# Patient Record
Sex: MALE | Race: WHITE | NOT HISPANIC OR LATINO
[De-identification: names, ages, dates, MRNs, and addresses within clinical notes are randomized per-mention and may not be internally consistent; named-entity substitution may affect disease eponyms.]

---

## 2018-06-08 ENCOUNTER — APPOINTMENT (OUTPATIENT)
Dept: OTOLARYNGOLOGY | Facility: CLINIC | Age: 23
End: 2018-06-08
Payer: COMMERCIAL

## 2018-06-08 VITALS
SYSTOLIC BLOOD PRESSURE: 128 MMHG | BODY MASS INDEX: 27.77 KG/M2 | HEIGHT: 78 IN | WEIGHT: 240 LBS | DIASTOLIC BLOOD PRESSURE: 86 MMHG | HEART RATE: 65 BPM

## 2018-06-08 DIAGNOSIS — Z78.9 OTHER SPECIFIED HEALTH STATUS: ICD-10-CM

## 2018-06-08 PROCEDURE — 99204 OFFICE O/P NEW MOD 45 MIN: CPT | Mod: 25

## 2018-06-08 PROCEDURE — 31231 NASAL ENDOSCOPY DX: CPT

## 2018-06-08 RX ORDER — FLUOXETINE HYDROCHLORIDE 20 MG/1
20 CAPSULE ORAL
Qty: 30 | Refills: 0 | Status: COMPLETED | COMMUNITY
Start: 2017-12-12

## 2018-06-08 RX ORDER — ESCITALOPRAM OXALATE 10 MG/1
10 TABLET ORAL
Qty: 30 | Refills: 0 | Status: COMPLETED | COMMUNITY
Start: 2018-02-01

## 2019-01-02 ENCOUNTER — APPOINTMENT (OUTPATIENT)
Dept: ORTHOPEDIC SURGERY | Facility: CLINIC | Age: 24
End: 2019-01-02
Payer: COMMERCIAL

## 2019-01-02 ENCOUNTER — FORM ENCOUNTER (OUTPATIENT)
Age: 24
End: 2019-01-02

## 2019-01-02 VITALS — RESPIRATION RATE: 16 BRPM | BODY MASS INDEX: 28.93 KG/M2 | HEIGHT: 78 IN | WEIGHT: 250 LBS

## 2019-01-02 PROCEDURE — 73070 X-RAY EXAM OF ELBOW: CPT | Mod: RT

## 2019-01-02 PROCEDURE — 99203 OFFICE O/P NEW LOW 30 MIN: CPT

## 2019-01-02 PROCEDURE — 73110 X-RAY EXAM OF WRIST: CPT | Mod: RT

## 2019-01-03 ENCOUNTER — APPOINTMENT (OUTPATIENT)
Dept: MRI IMAGING | Facility: CLINIC | Age: 24
End: 2019-01-03

## 2019-01-03 ENCOUNTER — APPOINTMENT (OUTPATIENT)
Dept: MRI IMAGING | Facility: CLINIC | Age: 24
End: 2019-01-03
Payer: COMMERCIAL

## 2019-01-03 ENCOUNTER — OUTPATIENT (OUTPATIENT)
Dept: OUTPATIENT SERVICES | Facility: HOSPITAL | Age: 24
LOS: 1 days | End: 2019-01-03
Payer: COMMERCIAL

## 2019-01-03 DIAGNOSIS — M25.532 PAIN IN LEFT WRIST: ICD-10-CM

## 2019-01-03 PROCEDURE — 73221 MRI JOINT UPR EXTREM W/O DYE: CPT

## 2019-01-03 PROCEDURE — 73221 MRI JOINT UPR EXTREM W/O DYE: CPT | Mod: 26,RT

## 2019-01-11 ENCOUNTER — APPOINTMENT (OUTPATIENT)
Dept: ORTHOPEDIC SURGERY | Facility: CLINIC | Age: 24
End: 2019-01-11
Payer: COMMERCIAL

## 2019-01-11 VITALS — RESPIRATION RATE: 16 BRPM | BODY MASS INDEX: 28.93 KG/M2 | HEIGHT: 78 IN | WEIGHT: 250 LBS

## 2019-01-11 DIAGNOSIS — M25.532 PAIN IN LEFT WRIST: ICD-10-CM

## 2019-01-11 PROCEDURE — 73110 X-RAY EXAM OF WRIST: CPT | Mod: RT

## 2019-01-11 PROCEDURE — 73070 X-RAY EXAM OF ELBOW: CPT | Mod: RT

## 2019-01-11 PROCEDURE — 99214 OFFICE O/P EST MOD 30 MIN: CPT

## 2019-01-11 NOTE — PHYSICAL EXAM
[de-identified] : Physical exam demonstrates the patient to be alert and oriented x 3 and capable of ambulation. The patient is well-developed and well-nourished in no apparent respiratory distress. The majority of the skin is intact bilaterally in the upper extremities without any bilateral elbow lymphadenopathy. \par \par \par There is good capillary refill of the digits bilaterally. There are no masses palpated or sensitivity over the median and ulnar nerves at the level of the wrist. There is a negative Tinel's and negative Phalen's and a negative carpal tunnel compression test bilaterally. Sensation is intact to light touch bilaterally.\par Range of motionOn right elbow from near full extension to 120° of flexion with supination of 70° and pronation is 60°.  over bilateral scaphoid tubercle fractures [de-identified] : PA lateral oblique x-rays of both wrists demonstrate nondisplaced scaphoid tubercle fractures as well as a radial head fracture in good alignment on the right

## 2019-01-11 NOTE — HISTORY OF PRESENT ILLNESS
[Right] : right hand dominant [FreeTextEntry1] : DOI: 12/29/18\par \par Pt returns to discuss treatment plan s/p MRI results showing bilateral scaphoid tubercle fractures. Patient also had bilateral scaphoid tubercle fractures

## 2019-01-11 NOTE — ASSESSMENT
[FreeTextEntry1] : We discussed treatment options. She wishes making was undertaken. She may recommend protected range of motion right elbow. We discussed casting one wrist versus splinting both and the risks associated with it. Certainly with the removal placed slightly higher chance of a nonunion but because it is bilateral this is difficult for him. He would like to go with full-time splinting. He will continue full-time splinting I will see him back in 3-4 weeks

## 2019-02-08 ENCOUNTER — APPOINTMENT (OUTPATIENT)
Dept: ORTHOPEDIC SURGERY | Facility: CLINIC | Age: 24
End: 2019-02-08
Payer: COMMERCIAL

## 2019-02-08 VITALS — WEIGHT: 250 LBS | HEIGHT: 78 IN | RESPIRATION RATE: 16 BRPM | BODY MASS INDEX: 28.93 KG/M2

## 2019-02-08 DIAGNOSIS — S62.024D NONDISPLACED FRACTURE OF MIDDLE THIRD OF NAVICULAR [SCAPHOID] BONE OF RIGHT WRIST, SUBSEQUENT ENCOUNTER FOR FRACTURE WITH ROUTINE HEALING: ICD-10-CM

## 2019-02-08 DIAGNOSIS — S62.025A NONDISPLACED FRACTURE OF MIDDLE THIRD OF NAVICULAR [SCAPHOID] BONE OF LEFT WRIST, INITIAL ENCOUNTER FOR CLOSED FRACTURE: ICD-10-CM

## 2019-02-08 DIAGNOSIS — S52.134D NONDISPLACED FRACTURE OF NECK OF RIGHT RADIUS, SUBSEQUENT ENCOUNTER FOR CLOSED FRACTURE WITH ROUTINE HEALING: ICD-10-CM

## 2019-02-08 PROCEDURE — 73110 X-RAY EXAM OF WRIST: CPT | Mod: LT

## 2019-02-08 PROCEDURE — 99214 OFFICE O/P EST MOD 30 MIN: CPT

## 2019-02-08 PROCEDURE — 73070 X-RAY EXAM OF ELBOW: CPT | Mod: RT

## 2020-11-23 ENCOUNTER — APPOINTMENT (OUTPATIENT)
Dept: OTOLARYNGOLOGY | Facility: CLINIC | Age: 25
End: 2020-11-23
Payer: COMMERCIAL

## 2020-11-23 VITALS
TEMPERATURE: 97.7 F | DIASTOLIC BLOOD PRESSURE: 66 MMHG | HEART RATE: 59 BPM | BODY MASS INDEX: 27.77 KG/M2 | SYSTOLIC BLOOD PRESSURE: 138 MMHG | HEIGHT: 78 IN | WEIGHT: 240 LBS

## 2020-11-23 PROCEDURE — G0268 REMOVAL OF IMPACTED WAX MD: CPT

## 2020-11-23 PROCEDURE — 99214 OFFICE O/P EST MOD 30 MIN: CPT | Mod: 25

## 2020-11-23 PROCEDURE — 92567 TYMPANOMETRY: CPT

## 2020-11-23 PROCEDURE — 92557 COMPREHENSIVE HEARING TEST: CPT

## 2020-11-23 NOTE — ASSESSMENT
[FreeTextEntry1] : pt with non- obstructing cerumen and hearing loss\par also with injury 2 mo ago not completely healed, will give drops and re-assess to make sure heals or can consider biopsy \par ear hygiene\par Audio WNL\par follow up in a few weeks\par

## 2020-11-23 NOTE — PHYSICAL EXAM
[Normal] : tympanic membranes are normal in both ears [de-identified] : some cerumen right, some cerumen left, also a scab on the floor of the canal clear, not completely healed under it - junction 1/3 to out floor of the canal

## 2020-11-23 NOTE — PROCEDURE
[FreeTextEntry3] : Procedure- removal of cerumen bilaterally\par Diagnosis - cerumen impaction\par bilateral ears found to have impacted cerumen - they were cleared with suction and curette, canals appeared normal.\par scab cleared left floor of the canal

## 2020-11-23 NOTE — HISTORY OF PRESENT ILLNESS
[de-identified] : right 2-3 weeks \par left 1-2 mo \par ear pressure and some hearing loss\par blood from left ear after manipulation 2 mo ago\par no Vertigo, tinnitus, pain, drainage or facial weakness.\par \par feels nose is ok \par

## 2020-12-22 ENCOUNTER — APPOINTMENT (OUTPATIENT)
Dept: OTOLARYNGOLOGY | Facility: CLINIC | Age: 25
End: 2020-12-22

## 2021-01-07 ENCOUNTER — EMERGENCY (EMERGENCY)
Facility: HOSPITAL | Age: 26
LOS: 1 days | Discharge: HOME | End: 2021-01-07
Attending: EMERGENCY MEDICINE | Admitting: EMERGENCY MEDICINE
Payer: COMMERCIAL

## 2021-01-07 VITALS
TEMPERATURE: 97 F | HEIGHT: 78 IN | DIASTOLIC BLOOD PRESSURE: 95 MMHG | RESPIRATION RATE: 16 BRPM | SYSTOLIC BLOOD PRESSURE: 153 MMHG | OXYGEN SATURATION: 98 % | WEIGHT: 255.07 LBS | HEART RATE: 88 BPM

## 2021-01-07 VITALS
SYSTOLIC BLOOD PRESSURE: 129 MMHG | DIASTOLIC BLOOD PRESSURE: 88 MMHG | RESPIRATION RATE: 20 BRPM | TEMPERATURE: 98 F | HEART RATE: 76 BPM | OXYGEN SATURATION: 98 %

## 2021-01-07 LAB
ALBUMIN SERPL ELPH-MCNC: 3.9 G/DL — SIGNIFICANT CHANGE UP (ref 3.3–5)
ALP SERPL-CCNC: 89 U/L — SIGNIFICANT CHANGE UP (ref 30–120)
ALT FLD-CCNC: 35 U/L DA — SIGNIFICANT CHANGE UP (ref 10–60)
ANION GAP SERPL CALC-SCNC: 9 MMOL/L — SIGNIFICANT CHANGE UP (ref 5–17)
AST SERPL-CCNC: 23 U/L — SIGNIFICANT CHANGE UP (ref 10–40)
BASOPHILS # BLD AUTO: 0.05 K/UL — SIGNIFICANT CHANGE UP (ref 0–0.2)
BASOPHILS NFR BLD AUTO: 0.9 % — SIGNIFICANT CHANGE UP (ref 0–2)
BILIRUB SERPL-MCNC: 0.4 MG/DL — SIGNIFICANT CHANGE UP (ref 0.2–1.2)
BUN SERPL-MCNC: 15 MG/DL — SIGNIFICANT CHANGE UP (ref 7–23)
CALCIUM SERPL-MCNC: 8.9 MG/DL — SIGNIFICANT CHANGE UP (ref 8.4–10.5)
CHLORIDE SERPL-SCNC: 104 MMOL/L — SIGNIFICANT CHANGE UP (ref 96–108)
CO2 SERPL-SCNC: 26 MMOL/L — SIGNIFICANT CHANGE UP (ref 22–31)
CREAT SERPL-MCNC: 0.98 MG/DL — SIGNIFICANT CHANGE UP (ref 0.5–1.3)
D DIMER BLD IA.RAPID-MCNC: <150 NG/ML DDU — SIGNIFICANT CHANGE UP
EOSINOPHIL # BLD AUTO: 0.45 K/UL — SIGNIFICANT CHANGE UP (ref 0–0.5)
EOSINOPHIL NFR BLD AUTO: 8.4 % — HIGH (ref 0–6)
GLUCOSE SERPL-MCNC: 103 MG/DL — HIGH (ref 70–99)
HCT VFR BLD CALC: 45.1 % — SIGNIFICANT CHANGE UP (ref 39–50)
HGB BLD-MCNC: 15.5 G/DL — SIGNIFICANT CHANGE UP (ref 13–17)
IMM GRANULOCYTES NFR BLD AUTO: 0.2 % — SIGNIFICANT CHANGE UP (ref 0–1.5)
LYMPHOCYTES # BLD AUTO: 1.17 K/UL — SIGNIFICANT CHANGE UP (ref 1–3.3)
LYMPHOCYTES # BLD AUTO: 21.8 % — SIGNIFICANT CHANGE UP (ref 13–44)
MCHC RBC-ENTMCNC: 29.9 PG — SIGNIFICANT CHANGE UP (ref 27–34)
MCHC RBC-ENTMCNC: 34.4 GM/DL — SIGNIFICANT CHANGE UP (ref 32–36)
MCV RBC AUTO: 87.1 FL — SIGNIFICANT CHANGE UP (ref 80–100)
MONOCYTES # BLD AUTO: 0.5 K/UL — SIGNIFICANT CHANGE UP (ref 0–0.9)
MONOCYTES NFR BLD AUTO: 9.3 % — SIGNIFICANT CHANGE UP (ref 2–14)
NEUTROPHILS # BLD AUTO: 3.19 K/UL — SIGNIFICANT CHANGE UP (ref 1.8–7.4)
NEUTROPHILS NFR BLD AUTO: 59.4 % — SIGNIFICANT CHANGE UP (ref 43–77)
NRBC # BLD: 0 /100 WBCS — SIGNIFICANT CHANGE UP (ref 0–0)
PLATELET # BLD AUTO: 223 K/UL — SIGNIFICANT CHANGE UP (ref 150–400)
POTASSIUM SERPL-MCNC: 3.7 MMOL/L — SIGNIFICANT CHANGE UP (ref 3.5–5.3)
POTASSIUM SERPL-SCNC: 3.7 MMOL/L — SIGNIFICANT CHANGE UP (ref 3.5–5.3)
PROT SERPL-MCNC: 7.6 G/DL — SIGNIFICANT CHANGE UP (ref 6–8.3)
RBC # BLD: 5.18 M/UL — SIGNIFICANT CHANGE UP (ref 4.2–5.8)
RBC # FLD: 11.2 % — SIGNIFICANT CHANGE UP (ref 10.3–14.5)
SARS-COV-2 RNA SPEC QL NAA+PROBE: DETECTED
SODIUM SERPL-SCNC: 139 MMOL/L — SIGNIFICANT CHANGE UP (ref 135–145)
TROPONIN I SERPL-MCNC: 0 NG/ML — LOW (ref 0.02–0.06)
WBC # BLD: 5.37 K/UL — SIGNIFICANT CHANGE UP (ref 3.8–10.5)
WBC # FLD AUTO: 5.37 K/UL — SIGNIFICANT CHANGE UP (ref 3.8–10.5)

## 2021-01-07 PROCEDURE — 85025 COMPLETE CBC W/AUTO DIFF WBC: CPT

## 2021-01-07 PROCEDURE — 36415 COLL VENOUS BLD VENIPUNCTURE: CPT

## 2021-01-07 PROCEDURE — 80053 COMPREHEN METABOLIC PANEL: CPT

## 2021-01-07 PROCEDURE — 99285 EMERGENCY DEPT VISIT HI MDM: CPT

## 2021-01-07 PROCEDURE — U0005: CPT

## 2021-01-07 PROCEDURE — 84484 ASSAY OF TROPONIN QUANT: CPT

## 2021-01-07 PROCEDURE — 85379 FIBRIN DEGRADATION QUANT: CPT

## 2021-01-07 PROCEDURE — 71045 X-RAY EXAM CHEST 1 VIEW: CPT | Mod: 26

## 2021-01-07 PROCEDURE — U0003: CPT

## 2021-01-07 PROCEDURE — 71045 X-RAY EXAM CHEST 1 VIEW: CPT

## 2021-01-07 PROCEDURE — 93005 ELECTROCARDIOGRAM TRACING: CPT

## 2021-01-07 PROCEDURE — 84443 ASSAY THYROID STIM HORMONE: CPT

## 2021-01-07 PROCEDURE — 99284 EMERGENCY DEPT VISIT MOD MDM: CPT | Mod: 25

## 2021-01-07 PROCEDURE — 93010 ELECTROCARDIOGRAM REPORT: CPT

## 2021-01-07 NOTE — ED PROVIDER NOTE - PATIENT PORTAL LINK FT
You can access the FollowMyHealth Patient Portal offered by [facility] by registering at the following website: [email or web address]. By joining [facility]’s FollowMyHealth portal, you will also be able to view your health information using other applications (apps) compatible with our system.

## 2021-01-07 NOTE — ED PROVIDER NOTE - PROGRESS NOTE DETAILS
EKG labs and CXR normal. COVID swab pending. NSR on monitor thruout ER stay. Plan - DC home with cardio follow up.

## 2021-01-07 NOTE — ED ADULT NURSE NOTE - NSIMPLEMENTINTERV_GEN_ALL_ED
Implemented All Universal Safety Interventions:  [place] to call system. Call bell, personal items and telephone within reach. Instruct patient to call for assistance. Room bathroom lighting operational. Non-slip footwear when patient is off stretcher. Physically safe environment: no spills, clutter or unnecessary equipment. Stretcher in lowest position, wheels locked, appropriate side rails in place.

## 2021-01-07 NOTE — ED PROVIDER NOTE - OBJECTIVE STATEMENT
27 y/o M with no significant PMHx presents to the ED c/o palpitations, rapid heart beat around 12:30 PM today while working on computer. Pt drank some water and walked around for a while and sx resolved. Pt now feels fine. Admits to taking 120 mg pseudophed for nasal congestion. Denies fever, cough, SOB , headache, chest pain, or other sx. Has no PCP present. Non-smoker. No recent EtOH intake. States he has ritalin prescription at home but hasn't been taking it for a while.

## 2021-01-07 NOTE — ED ADULT NURSE NOTE - CAS DISCH CONDITION
Mya reyes patient representative family is really concerned regarding the recent increase of Seroquel from 100 mg to 150 mg at bedtime and the start of lexapro 10 mg requesting to keep the Seroquel at 100 mg nightly and see how she does with the addition of lexapro 10 mg daily.      Printed for MD   New orders will need to be faxed to [facility] [phone] and [facility] [phone]   Stable

## 2021-01-07 NOTE — ED PROVIDER NOTE - INTERPRETATION
normal sinus rhythm normal sinus rhythm, Normal axis, Normal OH interval and QRS complex. There are no acute ischemic ST or T-wave changes.

## 2021-01-07 NOTE — ED PROVIDER NOTE - CLINICAL SUMMARY MEDICAL DECISION MAKING FREE TEXT BOX
26 M with palpitations after taking large dose of decongestant. Plan: EKG, labs, and monitor on cardiac monitor.

## 2021-01-07 NOTE — ED PROVIDER NOTE - CARE PROVIDER_API CALL
uQang Irwin  CARDIOVASCULAR DISEASE  [address], [address]  [place] [address]  Phone: [phone]  Fax: [phone]  Follow Up Time: 1-3 Days

## 2021-01-07 NOTE — ED ADULT TRIAGE NOTE - CHIEF COMPLAINT QUOTE
" I was at home working , I felt out of breath and then I felt my heart rate was fast and   about to faint "

## 2021-01-07 NOTE — ED ADULT NURSE NOTE - OBJECTIVE STATEMENT
26 YOM A&OX3 with no pertinent pmh presents to ED for chest palpitations. Pt states he was working on his computer earlier today when he felt his heart beating fast around 12:30pm. pt states his symptoms resolved after walking around and drinking water. pt states he did take Sudafed for nasal congestion and drank one cup of coffee before incident. pt expresses chest discomfort rated 1/10. pt placed on cardiac monitor in ED shows NSR in 60s, EKG completed in ED. pt denies chest pain, sob, dizziness, blurry vision, n/v/d, headaches. safety maintained.

## 2021-01-07 NOTE — ED ADULT NURSE NOTE - JUGULAR VENOUS DISTENTION
REVIEW OF SYSTEMS:    CONSTITUTIONAL: No weakness, fevers or chills  EYES/ENT: No visual changes;  No vertigo or throat pain   NECK: No pain or stiffness  RESPIRATORY: No cough, wheezing, hemoptysis; No shortness of breath  CARDIOVASCULAR: No chest pain or palpitations  GASTROINTESTINAL: No abdominal or epigastric pain. + nausea, vomiting, or hematemesis; No diarrhea or constipation. No melena or hematochezia.  GENITOURINARY: No dysuria, frequency or hematuria L flank pain  NEUROLOGICAL: No numbness or weakness  SKIN: No itching, burning, rashes, or lesions   All other review of systems is negative unless indicated above.
absent

## 2021-01-07 NOTE — ED PROVIDER NOTE - NSFOLLOWUPINSTRUCTIONS_ED_ALL_ED_FT
Heart Palpitations    WHAT YOU NEED TO KNOW:    Heart palpitations are feelings that your heart races, jumps, throbs, or flutters. You may feel extra beats, no beats for a short time, or skipped beats. You may have these feelings in your chest, throat, or neck. They may happen when you are sitting, standing, or lying. Heart palpitations may be frightening, but are usually not caused by a serious problem.     DISCHARGE INSTRUCTIONS:    Call 911 or have someone else call for any of the following:   •You have any of the following signs of a heart attack: ?Squeezing, pressure, or pain in your chest      ?You may also have any of the following: ?Discomfort or pain in your back, neck, jaw, stomach, or arm      ?Shortness of breath      ?Nausea or vomiting      ?Lightheadedness or a sudden cold sweat        •You have any of the following signs of a stroke: ?Numbness or drooping on one side of your face       ?Weakness in an arm or leg      ?Confusion or difficulty speaking      ?Dizziness, a severe headache, or vision loss      •You faint or lose consciousness.       Return to the emergency department if:   •Your palpitations happen more often or get more intense.           Contact your healthcare provider if:   •You have new or worsening swelling in your feet or ankles.      •You have questions or concerns about your condition or care.      Follow up with your healthcare provider as directed: You may need to follow up with a cardiologist. You may need tests to check for heart problems that cause palpitations. Write down your questions so you remember to ask them during your visits.     Keep a record: Write down when your palpitations start and stop, what you were doing when they started, and your symptoms. Keep track of what you ate or drank within a few hours of your palpitations. Include anything that seemed to help your symptoms, such as lying down or holding your breath. This record will help you and your healthcare provider learn what triggers your palpitations. Bring this record with you to your follow up visits.    Help prevent heart palpitations:   •Manage stress and anxiety. Find ways to relax such as listening to music, meditating, or doing yoga. Exercise can also help decrease stress and anxiety. Talk to someone you trust about your stress or anxiety. You can also talk to a therapist.       •Get plenty of sleep every night. Ask your healthcare provider how much sleep you need each night.       •Do not drink caffeine or alcohol. Caffeine and alcohol can make your palpitations worse. Caffeine is found in soda, coffee, tea, chocolate, and drinks that increase your energy.       •Do not smoke. Nicotine and other chemicals in cigarettes and cigars may damage your heart and blood vessels. Ask your healthcare provider for information if you currently smoke and need help to quit. E-cigarettes or smokeless tobacco still contain nicotine. Talk to your healthcare provider before you use these products.       •Do not use illegal drugs. Talk to your healthcare provider if you use illegal drugs and want help to quit.

## 2021-01-08 LAB — TSH SERPL-MCNC: 0.86 UIU/ML — SIGNIFICANT CHANGE UP (ref 0.27–4.2)

## 2021-01-15 PROBLEM — Z78.9 OTHER SPECIFIED HEALTH STATUS: Chronic | Status: ACTIVE | Noted: 2021-01-07

## 2021-02-05 ENCOUNTER — NON-APPOINTMENT (OUTPATIENT)
Age: 26
End: 2021-02-05

## 2021-02-05 ENCOUNTER — APPOINTMENT (OUTPATIENT)
Dept: CARDIOLOGY | Facility: CLINIC | Age: 26
End: 2021-02-05
Payer: COMMERCIAL

## 2021-02-05 VITALS
WEIGHT: 245 LBS | OXYGEN SATURATION: 96 % | BODY MASS INDEX: 28.35 KG/M2 | DIASTOLIC BLOOD PRESSURE: 85 MMHG | HEIGHT: 78 IN | HEART RATE: 64 BPM | SYSTOLIC BLOOD PRESSURE: 130 MMHG

## 2021-02-05 PROCEDURE — 99204 OFFICE O/P NEW MOD 45 MIN: CPT

## 2021-02-05 PROCEDURE — 93000 ELECTROCARDIOGRAM COMPLETE: CPT

## 2021-02-05 PROCEDURE — 99072 ADDL SUPL MATRL&STAF TM PHE: CPT

## 2021-02-05 RX ORDER — TRAZODONE HYDROCHLORIDE 50 MG/1
50 TABLET ORAL
Qty: 60 | Refills: 0 | Status: DISCONTINUED | COMMUNITY
Start: 2018-04-13 | End: 2021-02-05

## 2021-02-05 RX ORDER — METHYLPHENIDATE HYDROCHLORIDE 20 MG/1
20 TABLET ORAL
Qty: 90 | Refills: 0 | Status: DISCONTINUED | COMMUNITY
Start: 2018-04-27 | End: 2021-02-05

## 2021-02-05 RX ORDER — ONDANSETRON 4 MG/1
4 TABLET, ORALLY DISINTEGRATING ORAL
Qty: 30 | Refills: 0 | Status: DISCONTINUED | COMMUNITY
Start: 2018-02-01 | End: 2021-02-05

## 2021-02-05 RX ORDER — ESCITALOPRAM OXALATE 20 MG/1
20 TABLET ORAL
Qty: 30 | Refills: 0 | Status: DISCONTINUED | COMMUNITY
Start: 2018-04-13 | End: 2021-02-05

## 2021-02-05 RX ORDER — OFLOXACIN OTIC 3 MG/ML
0.3 SOLUTION AURICULAR (OTIC) TWICE DAILY
Qty: 1 | Refills: 2 | Status: DISCONTINUED | COMMUNITY
Start: 2020-11-23 | End: 2021-02-05

## 2021-02-08 ENCOUNTER — APPOINTMENT (OUTPATIENT)
Dept: OTOLARYNGOLOGY | Facility: CLINIC | Age: 26
End: 2021-02-08
Payer: COMMERCIAL

## 2021-02-08 VITALS
DIASTOLIC BLOOD PRESSURE: 85 MMHG | WEIGHT: 245 LBS | SYSTOLIC BLOOD PRESSURE: 134 MMHG | HEART RATE: 55 BPM | BODY MASS INDEX: 28.35 KG/M2 | TEMPERATURE: 98 F | HEIGHT: 78 IN

## 2021-02-08 DIAGNOSIS — H61.21 IMPACTED CERUMEN, RIGHT EAR: ICD-10-CM

## 2021-02-08 DIAGNOSIS — S00.412D: ICD-10-CM

## 2021-02-08 PROCEDURE — G0268 REMOVAL OF IMPACTED WAX MD: CPT

## 2021-02-08 PROCEDURE — 99072 ADDL SUPL MATRL&STAF TM PHE: CPT

## 2021-02-08 PROCEDURE — 31231 NASAL ENDOSCOPY DX: CPT

## 2021-02-08 PROCEDURE — 92557 COMPREHENSIVE HEARING TEST: CPT

## 2021-02-08 PROCEDURE — 99214 OFFICE O/P EST MOD 30 MIN: CPT | Mod: 25

## 2021-02-08 PROCEDURE — 92567 TYMPANOMETRY: CPT

## 2021-02-08 NOTE — HISTORY OF PRESENT ILLNESS
[de-identified] : pt following up after a injury that happened in 2020, had a left sided abrasion, is s/p oflaxin use. \par S/p covid in Jan since then has been having R ear pressure and tinnitus. with R HL \par some days can be worse than others  \par +occ popping R ear \par no Vertigo, pain, drainage or facial weakness.\par \par Pt also with intermittent occ nasal congestion b/l that has been going on for many years but does not happen everyday\par R sided sinus pressure around his eyes and frontal region

## 2021-02-08 NOTE — PROCEDURE
[FreeTextEntry3] : Procedure- removal of cerumen/ FB right \par Diagnosis - right cerumen/FB impaction\par Right ear found to have impacted cerumen/FB - it was cleared with curette, canal appeared normal.\par  [FreeTextEntry6] : Procedure performed: Nasal Endoscopy- Diagnostic\par Pre-op indication(s): nasal congestion\par Post-op indication(s): nasal congestion \par Verbal and/or written consent obtained from patient\par Anterior rhinoscopy insufficient to account for symptoms\par Scope #: 3,  flexible fiber optic telescope \par The scope was introduced in the nasal passage between the middle and inferior turbinates to exam the inferior portion of the middle meatus and the fontanelle, as well as the maxillary ostia.  Next, the scope was passed medically and posteriorly to the middle turbinates to examine the sphenoethmoid recess and the superior turbinate region.\par Upon visualization the finders are as follows:\par Nasal Septum: sigmoidal septal deviation\par Bilateral - Mucosa: boggy turbinates, Mucous: scant, Polyp: OMC polypoid changes b/l  , Inferior Turbinate: boggy, Middle Turbinate: normal, Superior Turbinate: normal, Inferior Meatus: narrow, Middle Meatus: narrow, Super Meatus:normal, Sphenoethmoidal Recess: clear\par

## 2021-02-08 NOTE — ASSESSMENT
[FreeTextEntry1] : pt with non- obstructing cerumen and subjective hearing loss\par injury in nov 2020, abrasion of the left ear canal, s/p ofloxacin drops- healing well \par On exam today AD- dry, and with cerumen/cotton; AS: abrasion healed well \par ear hygiene\par Audio WNL\par possibly transient ET disfunction, will treat nose and see if improves \par \par \par \par pt also with mild polypoid changes b/l \par Nasal congestion with mild septal deviation and bilateral turbinate hypertrophy. Will start regiment to see if may improve symptoms and escalate if needed \par - Will start Flonase. A topical steroid reduce mucosal swelling, illustrated appropriate use and how to reduce the risk of bleeding \par - Nasal irrigation and showed how to use it to maximize effectiveness \par \par

## 2021-02-08 NOTE — END OF VISIT
[FreeTextEntry3] : I personally saw and examined ANTONETTE FLORES in detail. I spoke to ALLEY Marquez regarding the assessment and plan of care.  I preformed the procedures and I reviewed the above assessment and plan of care, and agree. I have made changes in changes in the body of the note where appropriate.\par \par

## 2021-02-08 NOTE — PHYSICAL EXAM
[Midline] : trachea located in midline position [Normal] : no rashes [de-identified] : AD- dry, and with cerumen/cotton; AS: abrasion healing well

## 2021-02-21 NOTE — PHYSICAL EXAM
[General Appearance - Well Developed] : well developed [Normal Appearance] : normal appearance [Well Groomed] : well groomed [General Appearance - Well Nourished] : well nourished [No Deformities] : no deformities [General Appearance - In No Acute Distress] : no acute distress [Normal Conjunctiva] : the conjunctiva exhibited no abnormalities [Eyelids - No Xanthelasma] : the eyelids demonstrated no xanthelasmas [Normal Oral Mucosa] : normal oral mucosa [No Oral Pallor] : no oral pallor [No Oral Cyanosis] : no oral cyanosis [Normal Jugular Venous A Waves Present] : normal jugular venous A waves present [Normal Jugular Venous V Waves Present] : normal jugular venous V waves present [No Jugular Venous Brown A Waves] : no jugular venous brown A waves [Heart Rate And Rhythm] : heart rate and rhythm were normal [Heart Sounds] : normal S1 and S2 [Murmurs] : no murmurs present [Respiration, Rhythm And Depth] : normal respiratory rhythm and effort [Exaggerated Use Of Accessory Muscles For Inspiration] : no accessory muscle use [Auscultation Breath Sounds / Voice Sounds] : lungs were clear to auscultation bilaterally [Abdomen Soft] : soft [Abdomen Tenderness] : non-tender [Abdomen Mass (___ Cm)] : no abdominal mass palpated [Abnormal Walk] : normal gait [Gait - Sufficient For Exercise Testing] : the gait was sufficient for exercise testing [Nail Clubbing] : no clubbing of the fingernails [Cyanosis, Localized] : no localized cyanosis [Petechial Hemorrhages (___cm)] : no petechial hemorrhages [Skin Color & Pigmentation] : normal skin color and pigmentation [] : no rash [No Venous Stasis] : no venous stasis [Skin Lesions] : no skin lesions [No Skin Ulcers] : no skin ulcer [No Xanthoma] : no  xanthoma was observed [Oriented To Time, Place, And Person] : oriented to person, place, and time [Affect] : the affect was normal [Mood] : the mood was normal [No Anxiety] : not feeling anxious

## 2021-02-24 ENCOUNTER — APPOINTMENT (OUTPATIENT)
Dept: OTOLARYNGOLOGY | Facility: CLINIC | Age: 26
End: 2021-02-24
Payer: COMMERCIAL

## 2021-02-24 VITALS
BODY MASS INDEX: 28.35 KG/M2 | SYSTOLIC BLOOD PRESSURE: 124 MMHG | TEMPERATURE: 97.4 F | HEIGHT: 78 IN | HEART RATE: 75 BPM | DIASTOLIC BLOOD PRESSURE: 85 MMHG | WEIGHT: 245 LBS

## 2021-02-24 PROCEDURE — 99072 ADDL SUPL MATRL&STAF TM PHE: CPT

## 2021-02-24 PROCEDURE — 99214 OFFICE O/P EST MOD 30 MIN: CPT | Mod: 25

## 2021-02-24 PROCEDURE — 31231 NASAL ENDOSCOPY DX: CPT

## 2021-02-24 NOTE — HISTORY OF PRESENT ILLNESS
[No] : patient does not have a  history of radiation therapy [de-identified] : 26-year-old male\par Patient complains of right sided tinnitus x 2 months. States his right ear feels congested, has tried using Flonase with no relief of ear congestion. Trying to pop his ear with no relief.  Tinnitus is a constant dull buzzing, worse at night. States this all started after he got COVID. \par Pt has no ear pain, ear drainage, hearing loss, vertigo, nasal discharge, epistaxis, sinus infections, facial pain, facial pressure, throat pain, dysphagia or fevers\par \par  [Tinnitus] : tinnitus [Eustachian Tube Dysfunction] : no eustachian tube dysfunction [Cholesteatoma] : no cholesteatoma [Early Onset Hearing Loss] : no early onset hearing loss [Stroke] : no stroke [Nasal Congestion] : nasal congestion [Allergic Rhinitis] : no allergic rhinitis [Environmental Allergens] : no environmental allergens [Allergies] : no allergies [Asthma] : no asthma [Neck Mass] : no neck mass [Neck Pain] : no neck pain [Chills] : no chills [Hyperthyroidism] : no hyperthyroidism [Sialadenitis] : no sialadenitis [Hodgkin Disease] : no hodgkin disease [Non-Hodgkin Lymphoma] : no non-hodgkin lymphoma [None] : No risk factors have been identified. [Graves Disease] : no graves disease [Thyroid Cancer] : no thyroid cancer

## 2021-02-24 NOTE — PHYSICAL EXAM
[Nasal Endoscopy Performed] : nasal endoscopy was performed, see procedure section for findings [] : septum deviated to the right [Midline] : trachea located in midline position [Normal] : no rashes [Hearing Loss Right Only] : normal [Hearing Loss Left Only] : normal

## 2021-02-24 NOTE — ASSESSMENT
[FreeTextEntry1] : Patient complains of right sided tinnitus and congested right ear x 2 months \par \par DNS and right ETD \par -Rx: Steam humidification and hypertonic saline nasal irrigations \par -RX: Medrol dose pack \par possible myringotomy tube placement\par \par f/u In 10-14 day\par \par Patient reassured of my findings and recommendations

## 2021-03-01 ENCOUNTER — APPOINTMENT (OUTPATIENT)
Dept: OTOLARYNGOLOGY | Facility: CLINIC | Age: 26
End: 2021-03-01
Payer: COMMERCIAL

## 2021-03-01 VITALS
BODY MASS INDEX: 28.35 KG/M2 | TEMPERATURE: 98 F | WEIGHT: 245 LBS | SYSTOLIC BLOOD PRESSURE: 132 MMHG | HEIGHT: 78 IN | HEART RATE: 67 BPM | DIASTOLIC BLOOD PRESSURE: 90 MMHG

## 2021-03-01 DIAGNOSIS — J34.2 DEVIATED NASAL SEPTUM: ICD-10-CM

## 2021-03-01 DIAGNOSIS — H61.23 IMPACTED CERUMEN, BILATERAL: ICD-10-CM

## 2021-03-01 DIAGNOSIS — H90.3 SENSORINEURAL HEARING LOSS, BILATERAL: ICD-10-CM

## 2021-03-01 PROCEDURE — 99214 OFFICE O/P EST MOD 30 MIN: CPT

## 2021-03-01 PROCEDURE — 99072 ADDL SUPL MATRL&STAF TM PHE: CPT

## 2021-03-01 NOTE — PHYSICAL EXAM
[Nasal Endoscopy Performed] : nasal endoscopy was performed, see procedure section for findings [] : septum deviated to the right [Midline] : trachea located in midline position [Normal] : gait was normal [Hearing Loss Right Only] : normal [Hearing Loss Left Only] : normal

## 2021-03-01 NOTE — HISTORY OF PRESENT ILLNESS
[No] : patient does not have a  history of radiation therapy [Tinnitus] : tinnitus [Nasal Congestion] : nasal congestion [None] : No risk factors have been identified. [de-identified] : 26-year-old male\par Patient complains of right sided tinnitus x 2 months. States his right ear feels congested, has tried using Flonase with no relief of ear congestion. Trying to pop his ear with no relief.  Tinnitus is a constant dull buzzing, worse at night. States this all started after he got COVID. \par Pt has no ear pain, ear drainage, hearing loss, vertigo, nasal discharge, epistaxis, sinus infections, facial pain, facial pressure, throat pain, dysphagia or fevers\par \par  [FreeTextEntry1] : March 1, 2021\par Patient presents for follow up. States he started Medrol dose pack on  Thursday, started having bad headaches, "brain fog", dizziness and tinnitus increased. Spoke with  over the weekend, was told to stop the Medrol dose pack. States brain fog stopped but continues to have headache, tinnitus and dizziness. States he feels off balance throughout the day, laying down helps. \par no other modifying factors\par \par  [Eustachian Tube Dysfunction] : no eustachian tube dysfunction [Cholesteatoma] : no cholesteatoma [Early Onset Hearing Loss] : no early onset hearing loss [Stroke] : no stroke [Allergic Rhinitis] : no allergic rhinitis [Environmental Allergens] : no environmental allergens [Allergies] : no allergies [Asthma] : no asthma [Neck Mass] : no neck mass [Neck Pain] : no neck pain [Chills] : no chills [Hyperthyroidism] : no hyperthyroidism [Sialadenitis] : no sialadenitis [Hodgkin Disease] : no hodgkin disease [Non-Hodgkin Lymphoma] : no non-hodgkin lymphoma [Graves Disease] : no graves disease [Thyroid Cancer] : no thyroid cancer

## 2021-03-01 NOTE — ASSESSMENT
[FreeTextEntry1] : Patient with right ETD and tinnitus complains of headache and dizziness since starting Medrol dose pack on Thursday \par DNS and right ETD \par -CT sinus ordered \par \par Vestibulopathy:\par -possible vestibular testing \par -wait for medrol to flush out of system\par \par f/u post CT sinus or prn

## 2021-03-02 ENCOUNTER — APPOINTMENT (OUTPATIENT)
Dept: CT IMAGING | Facility: CLINIC | Age: 26
End: 2021-03-02
Payer: COMMERCIAL

## 2021-03-02 ENCOUNTER — OUTPATIENT (OUTPATIENT)
Dept: OUTPATIENT SERVICES | Facility: HOSPITAL | Age: 26
LOS: 1 days | End: 2021-03-02
Payer: COMMERCIAL

## 2021-03-02 DIAGNOSIS — J34.2 DEVIATED NASAL SEPTUM: ICD-10-CM

## 2021-03-02 DIAGNOSIS — H69.81 OTHER SPECIFIED DISORDERS OF EUSTACHIAN TUBE, RIGHT EAR: ICD-10-CM

## 2021-03-02 PROCEDURE — 70486 CT MAXILLOFACIAL W/O DYE: CPT

## 2021-03-02 PROCEDURE — 70486 CT MAXILLOFACIAL W/O DYE: CPT | Mod: 26

## 2021-03-04 ENCOUNTER — NON-APPOINTMENT (OUTPATIENT)
Age: 26
End: 2021-03-04

## 2021-03-04 ENCOUNTER — APPOINTMENT (OUTPATIENT)
Dept: OPHTHALMOLOGY | Facility: CLINIC | Age: 26
End: 2021-03-04
Payer: COMMERCIAL

## 2021-03-04 PROCEDURE — 99072 ADDL SUPL MATRL&STAF TM PHE: CPT

## 2021-03-04 PROCEDURE — 92004 COMPRE OPH EXAM NEW PT 1/>: CPT

## 2021-03-08 ENCOUNTER — NON-APPOINTMENT (OUTPATIENT)
Age: 26
End: 2021-03-08

## 2021-03-08 ENCOUNTER — APPOINTMENT (OUTPATIENT)
Dept: CARDIOLOGY | Facility: CLINIC | Age: 26
End: 2021-03-08
Payer: COMMERCIAL

## 2021-03-08 VITALS — SYSTOLIC BLOOD PRESSURE: 127 MMHG | DIASTOLIC BLOOD PRESSURE: 82 MMHG | OXYGEN SATURATION: 98 % | HEART RATE: 62 BPM

## 2021-03-08 PROCEDURE — 99214 OFFICE O/P EST MOD 30 MIN: CPT

## 2021-03-08 PROCEDURE — 99072 ADDL SUPL MATRL&STAF TM PHE: CPT

## 2021-03-08 PROCEDURE — 93000 ELECTROCARDIOGRAM COMPLETE: CPT

## 2021-03-08 RX ORDER — METHYLPREDNISOLONE 4 MG/1
4 TABLET ORAL
Qty: 21 | Refills: 0 | Status: DISCONTINUED | COMMUNITY
Start: 2021-02-24 | End: 2021-03-08

## 2021-03-11 ENCOUNTER — TRANSCRIPTION ENCOUNTER (OUTPATIENT)
Age: 26
End: 2021-03-11

## 2021-03-11 ENCOUNTER — APPOINTMENT (OUTPATIENT)
Dept: OTOLARYNGOLOGY | Facility: CLINIC | Age: 26
End: 2021-03-11
Payer: COMMERCIAL

## 2021-03-11 VITALS
HEART RATE: 72 BPM | BODY MASS INDEX: 28.35 KG/M2 | SYSTOLIC BLOOD PRESSURE: 130 MMHG | HEIGHT: 78 IN | DIASTOLIC BLOOD PRESSURE: 87 MMHG | WEIGHT: 245 LBS | TEMPERATURE: 97 F

## 2021-03-11 DIAGNOSIS — J34.3 HYPERTROPHY OF NASAL TURBINATES: ICD-10-CM

## 2021-03-11 DIAGNOSIS — H93.8X1 OTHER SPECIFIED DISORDERS OF RIGHT EAR: ICD-10-CM

## 2021-03-11 DIAGNOSIS — J34.89 OTHER SPECIFIED DISORDERS OF NOSE AND NASAL SINUSES: ICD-10-CM

## 2021-03-11 PROCEDURE — 99214 OFFICE O/P EST MOD 30 MIN: CPT | Mod: 25

## 2021-03-11 PROCEDURE — 99072 ADDL SUPL MATRL&STAF TM PHE: CPT

## 2021-03-11 PROCEDURE — 31231 NASAL ENDOSCOPY DX: CPT

## 2021-03-11 NOTE — ASSESSMENT
[FreeTextEntry1] : Complaining of symptoms that seem to be consistent with sinus related pressure feeling like he is in a fog he did have Covid back in January so it is always possible this may be residual effect from Covid infection however a CAT scan which was performed and reviewed did show evidence of left frontal thickening some ethmoid disease as well as some maxillary disease.  Endoscopically there is no pus or purulence in his nasal cavity bit of a deviated septum but not totally obstructive he certainly would benefit from a balloon if he does not respond to antibiotic therapy.  We discussed pros and cons he is amicable to both will get him tentatively scheduled for balloon procedure unless he dramatically improves on antibiotics and we can always postpone and/or cancel.  But this has been affecting his work now for couple of months and he wants to proceed in resolution.  He also knows the other option would be a surgical intervention in the OR which were obviously trying to avoid.

## 2021-03-11 NOTE — HISTORY OF PRESENT ILLNESS
[de-identified] : 25 y/o M with Right ear popping and tinnitus since having COVID-19 infection.  Feels some sinus pressure.  was treated with Steroids and feels made fogginess worse.   Had CT sinus that showed some ethmoid and frontal sinus disease and b/l maxillary sinus cysts.  Not on nasal sprays.  Has not had any abx treatment.

## 2021-03-11 NOTE — HISTORY OF PRESENT ILLNESS
[FreeTextEntry1] : 26 yea ashlid male without any past medical or cardiac history who presented to ER several weeks ago with covid symtptoms.   Pt was discharged from the ED and told to followup with a cardiologist.  Pt shortness of breath sx have slowly improved since ER visit.   He currently denies CP, SOB or H/A

## 2021-03-11 NOTE — DISCUSSION/SUMMARY
[FreeTextEntry1] : Syncope-Assoc with SOB in setting of covid.  No further sx\par \par Followup in 1 mth

## 2021-03-11 NOTE — PROCEDURE
[FreeTextEntry6] : Flexible scope #32 was used. Right nasal passage with hypertrophy of inferior, middle and superior turbinates. Nasal passage patent with clear middle meatus and sphenoethmoid recess. Left nasal passage with Hypertrophy of inferior, middle and superior turbinates. Nasal passage was patent with clear middle meatus and sphenoethmoid recess. No mucopurulence or polyps appreciated. Nasopharynx clear.\par \par

## 2021-03-16 ENCOUNTER — EMERGENCY (EMERGENCY)
Facility: HOSPITAL | Age: 26
LOS: 1 days | Discharge: HOME | End: 2021-03-16
Attending: EMERGENCY MEDICINE | Admitting: EMERGENCY MEDICINE
Payer: COMMERCIAL

## 2021-03-16 VITALS
DIASTOLIC BLOOD PRESSURE: 80 MMHG | SYSTOLIC BLOOD PRESSURE: 126 MMHG | HEART RATE: 71 BPM | OXYGEN SATURATION: 97 % | TEMPERATURE: 98 F | RESPIRATION RATE: 18 BRPM

## 2021-03-16 VITALS
HEART RATE: 76 BPM | WEIGHT: 244.93 LBS | OXYGEN SATURATION: 97 % | TEMPERATURE: 98 F | DIASTOLIC BLOOD PRESSURE: 96 MMHG | HEIGHT: 78 IN | RESPIRATION RATE: 16 BRPM | SYSTOLIC BLOOD PRESSURE: 158 MMHG

## 2021-03-16 PROCEDURE — 99283 EMERGENCY DEPT VISIT LOW MDM: CPT

## 2021-03-16 NOTE — ED PROVIDER NOTE - CLINICAL SUMMARY MEDICAL DECISION MAKING FREE TEXT BOX
Sinus headaches with ENT surgery scheduled. Neuro eval scheduled tomorrow. PE unrevealing. Plan - Continue antibiotic. Keep appointments as scheduled.

## 2021-03-16 NOTE — ED PROVIDER NOTE - OBJECTIVE STATEMENT
25 yo male with hx of COVID in january, has had intermittent sinus headaches since. Seen by ENT who did sinus CT revealing paranasal sinus disease and narrowing obstruction of sinus pathways. Has ENT sinus surgery scheduled next week. Has been on Augmentin for 4 days. Has appointment with 2 different neurologists for further eval of headaches tomorrow. Denies fever, NV, visual disturbance, neck pain, cough, SOB or other symptom.

## 2021-03-16 NOTE — ED ADULT TRIAGE NOTE - HEIGHT IN CM
Patient called from her primary dentist' office stating Dr. Franco had given her the name of an Oral surgeon he wanted her to see to remove some teeth with sedation and her dentist advised her that Dr. Katz is no longer seeing patients.  She was given 4 other Dentist' names by her primary Dentist that they normally refer patient's to at the , so they would like Dr. Franco to tell them which once they want her to see  Due to the need for sedation.      Dr.Robert Maribel Brooks    When Dr. Franco tells us which dentist he wants her to see, call her dentist office (Scotty Fowler @ [phone]) and let them know so they can send formal referral.    Keyanna Mccray RN on 7/27/2018 at 11:41 AM     [phone]

## 2021-03-16 NOTE — ED PROVIDER NOTE - NSFOLLOWUPINSTRUCTIONS_ED_ALL_ED_FT
Sinus Headache       A sinus headache happens when your sinuses get swollen or blocked (clogged). Sinuses are spaces behind the bones of your face and forehead. You may feel pain or pressure in your face, forehead, ears, or upper teeth. Sinus headaches can be mild or very bad.      Follow these instructions at home:    General instructions   •If told:  •Apply a warm, moist washcloth to your face. This can help to lessen pain.      •Use a nasal saline wash. Follow the directions on the bottle or box.          Medicines      •Take over-the-counter and prescription medicines only as told by your doctor.      •If you were prescribed an antibiotic medicine, take it as told by your doctor. Do not stop taking it even if you start to feel better.      •Use a nose spray if your nose feels full of mucus (congested).      Hydrate and humidify     •Drink enough water to keep your pee (urine) pale yellow.      •Use a cool mist humidifier to keep the humidity level in your home above 50%.      •Breathe in steam for 10–15 minutes, 3–4 times a day or as told by your doctor. You can do this in the bathroom while a hot shower is running.      •Try not to spend time in cool or dry air.        Contact a doctor if:    •You get more than one headache a week.      •Light or sound bothers you.      •You have a fever.      •You feel sick to your stomach (nauseous) or you throw up (vomit).      •Your headaches do not get better with treatment.        Get help right away if:    •You have trouble seeing.      •You suddenly have very bad pain in your face or head.      •You start to have quick, sudden movements or shaking that you cannot control (seizure).      •You are confused.      •You have a stiff neck.        Summary    •A sinus headache happens when your sinuses get swollen or blocked (clogged). Sinuses are spaces behind the bones of your face and forehead.      •You may feel pain or pressure in your face, forehead, ears, or upper teeth.      •Take over-the-counter and prescription medicines only as told by your doctor.      •If told, apply a warm, moist washcloth to your face. This can help to lessen pain.      This information is not intended to replace advice given to you by your health care provider. Make sure you discuss any questions you have with your health care provider.

## 2021-03-16 NOTE — ED PROVIDER NOTE - PATIENT PORTAL LINK FT
You can access the FollowMyHealth Patient Portal offered by [facility] by registering at the following website: [email or web address]. By joining [facility]’s FollowMyHealth portal, you will also be able to view your health information using other applications (apps) compatible with our system.

## 2021-03-16 NOTE — ED ADULT TRIAGE NOTE - CHIEF COMPLAINT QUOTE
"I am being treated for sinusitis for past 5 days with Augmentin. Yesterday I had tightness in left side of face & spoke with [facility]. Today I have rt sided tightness in my face & neck. I have popping in my rt ear & a headache"

## 2021-03-18 ENCOUNTER — APPOINTMENT (OUTPATIENT)
Dept: NEUROLOGY | Facility: CLINIC | Age: 26
End: 2021-03-18
Payer: COMMERCIAL

## 2021-03-18 VITALS
WEIGHT: 245 LBS | HEIGHT: 78 IN | DIASTOLIC BLOOD PRESSURE: 83 MMHG | SYSTOLIC BLOOD PRESSURE: 130 MMHG | BODY MASS INDEX: 28.35 KG/M2 | HEART RATE: 74 BPM

## 2021-03-18 VITALS — TEMPERATURE: 97.2 F

## 2021-03-18 VITALS — TEMPERATURE: 97.3 F

## 2021-03-18 PROCEDURE — 99072 ADDL SUPL MATRL&STAF TM PHE: CPT

## 2021-03-18 PROCEDURE — 99203 OFFICE O/P NEW LOW 30 MIN: CPT

## 2021-03-18 NOTE — HISTORY OF PRESENT ILLNESS
[FreeTextEntry1] : I had the pleasure of seeing Mr. Shawn Maguire in the office today.  He is a 26 year right-handed patient who was referred for neurologic evaluation at your kind suggestion.\par \par Mr. Maguire contracted COVID-19 in early January 2021.  He experienced shortness of breath, palpitations, myalgia and fever.\par \par Within a week of symptom onset, he developed a popping sound in his right ear and tinnitus.  He was evaluated by an otolaryngologist and treated with Flonase.  There was no evidence of fluid in his ear.  He was later treated with methylprednisolone.  He subsequently noted right retro-orbital, bitemporal and occipital daily headaches.  He has noted right visual blurring.  He was evaluated by an ophthalmologist Dr. Willard who detected no abnormalities except for astigmatism.  He has had occasional tingling in his right foot similar to symptoms he had when he sustained a back injury in high school.  He has had recent tightness in his left more than right face.\par \par He has been medicating himself with ibuprofen and acetaminophen daily.  He was recently begun on antibiotics for sinusitis.  CT of the sinuses performed on March 2, 2021 revealed frontal, ethmoid and maxillary mucosal thickening.

## 2021-03-18 NOTE — ASSESSMENT
[FreeTextEntry1] : Mr. Maguire is a 26-year-old who has experienced a myriad of symptoms following COVID-19 in January 2021.  He complains of right retro-orbital, bitemporal and occipital headaches, brain fog, right ear popping, right eye visual blurring, bifacial numbness and right foot tingling.  CT imaging revealed paranasal mucosal thickening.  Ophthalmologic otologic evaluations were unremarkable.\par \par Because of his persistent and myriad symptoms, I suggested that he undergo an MRI of the brain, internal auditory canals, orbits and MR venogram of the brain, all contrast-enhanced.  Further management will depend upon these results and his clinical course.  In the meantime, continued symptomatic treatment is indicated.

## 2021-03-18 NOTE — PHYSICAL EXAM
[FreeTextEntry1] : Constitutional:  Patient was well-developed, well-nourished and in no acute distress. \par \par Head:  Normocephalic, atraumatic. Tympanic membranes were clear. \par \par Neck:  Supple with full range of motion. \par \par Cardiovascular:  Cardiac rhythm was regular without murmur. There were no carotid bruits. Peripheral pulses were full and symmetric. \par \par Respiratory:  Lungs were clear. \par \par Abdomen:  Soft and nontender. \par \par Spine:  Nontender. \par \par Skin:  There were no rashes. \par \par NEUROLOGICAL EXAMINATION:\par \par Mental Status: Patient was alert and oriented. Speech was fluent. There was no dysarthria. \par \par Cranial Nerves: \par \par II: Visual acuity was 20/20-2 in the right eye and 20/20-1 in left eye with near card. Pupils were equal and reactive. Visual fields were full. Funduscopic examination was normal. \par \par III, IV, VI:  Eye movements were full without nystagmus. \par \par V: Facial sensation was intact. \par \par VII: Facial strength was normal. \par \par VIII: Hearing was equal. \par \par IX, X: Palatal movement was normal. Phonation was normal. \par \par XI: Sternocleidomastoids and trapezii were normal. \par \par XII: Tongue was midline and movements normal. There was no lingual atrophy or fasciculations. \par \par Motor Examination: Muscle bulk, tone and strength were normal. \par \par Sensory Examination: Pinprick, vibration and joint position sense were intact. \par \par Reflexes: DTRs were 2+ throughout. \par \par Plantar Responses: Plantar responses were flexor. \par \par Coordination/Cerebellar Function: There was no dysmetria on finger to nose or heel to shin testing. \par \par Gait/Stance: Gait and tandem were normal. Romberg was negative.\par

## 2021-03-18 NOTE — CONSULT LETTER
[Dear  ___] : Dear  [unfilled], [Consult Letter:] : I had the pleasure of evaluating your patient, [unfilled]. [Please see my note below.] : Please see my note below. [Consult Closing:] : Thank you very much for allowing me to participate in the care of this patient.  If you have any questions, please do not hesitate to contact me. [Sincerely,] : Sincerely, [FreeTextEntry3] : Jerry Anthony MD\par  [DrStevan  ___] : Dr. KWAN

## 2021-03-26 ENCOUNTER — RESULT REVIEW (OUTPATIENT)
Age: 26
End: 2021-03-26

## 2021-03-26 ENCOUNTER — APPOINTMENT (OUTPATIENT)
Dept: MRI IMAGING | Facility: CLINIC | Age: 26
End: 2021-03-26
Payer: COMMERCIAL

## 2021-03-26 ENCOUNTER — OUTPATIENT (OUTPATIENT)
Dept: OUTPATIENT SERVICES | Facility: HOSPITAL | Age: 26
LOS: 1 days | End: 2021-03-26

## 2021-03-26 ENCOUNTER — APPOINTMENT (OUTPATIENT)
Dept: DISASTER EMERGENCY | Facility: CLINIC | Age: 26
End: 2021-03-26

## 2021-03-26 DIAGNOSIS — F48.8 OTHER SPECIFIED NONPSYCHOTIC MENTAL DISORDERS: ICD-10-CM

## 2021-03-26 PROCEDURE — 70546 MR ANGIOGRAPH HEAD W/O&W/DYE: CPT | Mod: 26,59

## 2021-03-26 PROCEDURE — 70553 MRI BRAIN STEM W/O & W/DYE: CPT | Mod: 26

## 2021-03-26 PROCEDURE — 70543 MRI ORBT/FAC/NCK W/O &W/DYE: CPT | Mod: 26

## 2021-03-27 NOTE — DISCUSSION/SUMMARY
[FreeTextEntry1] : Syncope-Assoc with SOB in setting of covid.  No further sx\par \par Followup in 6 mths

## 2021-03-29 ENCOUNTER — NON-APPOINTMENT (OUTPATIENT)
Age: 26
End: 2021-03-29

## 2021-03-29 ENCOUNTER — LABORATORY RESULT (OUTPATIENT)
Age: 26
End: 2021-03-29

## 2021-03-29 ENCOUNTER — APPOINTMENT (OUTPATIENT)
Dept: OTOLARYNGOLOGY | Facility: CLINIC | Age: 26
End: 2021-03-29
Payer: COMMERCIAL

## 2021-03-29 LAB — SARS-COV-2 N GENE NPH QL NAA+PROBE: NOT DETECTED

## 2021-03-29 PROCEDURE — 99072 ADDL SUPL MATRL&STAF TM PHE: CPT

## 2021-03-29 PROCEDURE — 31296Z: CUSTOM | Mod: 50

## 2021-03-30 ENCOUNTER — NON-APPOINTMENT (OUTPATIENT)
Age: 26
End: 2021-03-30

## 2021-03-30 LAB
ALBUMIN MFR SERPL ELPH: 60.5 %
ALBUMIN SERPL ELPH-MCNC: 4.7 G/DL
ALBUMIN SERPL-MCNC: 4.4 G/DL
ALBUMIN/GLOB SERPL: 1.6 RATIO
ALP BLD-CCNC: 111 U/L
ALPHA1 GLOB MFR SERPL ELPH: 3.8 %
ALPHA1 GLOB SERPL ELPH-MCNC: 0.3 G/DL
ALPHA2 GLOB MFR SERPL ELPH: 8.2 %
ALPHA2 GLOB SERPL ELPH-MCNC: 0.6 G/DL
ALT SERPL-CCNC: 25 U/L
ANION GAP SERPL CALC-SCNC: 9 MMOL/L
APTT BLD: 31.8 SEC
AST SERPL-CCNC: 19 U/L
B-GLOBULIN MFR SERPL ELPH: 11.9 %
B-GLOBULIN SERPL ELPH-MCNC: 0.9 G/DL
BASOPHILS # BLD AUTO: 0.05 K/UL
BASOPHILS NFR BLD AUTO: 0.6 %
BILIRUB SERPL-MCNC: 0.3 MG/DL
BUN SERPL-MCNC: 22 MG/DL
CALCIUM SERPL-MCNC: 9.8 MG/DL
CHLORIDE SERPL-SCNC: 105 MMOL/L
CHOLEST SERPL-MCNC: 143 MG/DL
CO2 SERPL-SCNC: 29 MMOL/L
COVID-19 NUCLEOCAPSID  GAM ANTIBODY INTERPRETATION: POSITIVE
COVID-19 SPIKE DOMAIN ANTIBODY INTERPRETATION: POSITIVE
CREAT SERPL-MCNC: 1.12 MG/DL
CRP SERPL-MCNC: <3 MG/L
DEPRECATED D DIMER PPP IA-ACNC: <150 NG/ML DDU
DEPRECATED KAPPA LC FREE/LAMBDA SER: 0.8 RATIO
EOSINOPHIL # BLD AUTO: 0.25 K/UL
EOSINOPHIL NFR BLD AUTO: 3.1 %
ERYTHROCYTE [SEDIMENTATION RATE] IN BLOOD BY WESTERGREN METHOD: 2 MM/HR
FIBRINOGEN PPP COAG.DERIVED-MCNC: 353 MG/DL
FSP TITR PPP LA: < 5 UG/ML
GAMMA GLOB FLD ELPH-MCNC: 1.1 G/DL
GAMMA GLOB MFR SERPL ELPH: 15.6 %
GLUCOSE SERPL-MCNC: 86 MG/DL
HCT VFR BLD CALC: 49 %
HCYS SERPL-MCNC: 8.5 UMOL/L
HDLC SERPL-MCNC: 36 MG/DL
HGB BLD-MCNC: 16.1 G/DL
IGA SER QL IEP: 325 MG/DL
IGG SER QL IEP: 1146 MG/DL
IGM SER QL IEP: 178 MG/DL
IMM GRANULOCYTES NFR BLD AUTO: 0.4 %
INR PPP: 1.03 RATIO
INTERPRETATION SERPL IEP-IMP: NORMAL
KAPPA LC CSF-MCNC: 2.17 MG/DL
KAPPA LC SERPL-MCNC: 1.73 MG/DL
LDLC SERPL CALC-MCNC: 81 MG/DL
LYMPHOCYTES # BLD AUTO: 1.33 K/UL
LYMPHOCYTES NFR BLD AUTO: 16.4 %
M PROTEIN SPEC IFE-MCNC: NORMAL
MAN DIFF?: NORMAL
MCHC RBC-ENTMCNC: 30.4 PG
MCHC RBC-ENTMCNC: 32.9 GM/DL
MCV RBC AUTO: 92.6 FL
MONOCYTES # BLD AUTO: 0.44 K/UL
MONOCYTES NFR BLD AUTO: 5.4 %
NEUTROPHILS # BLD AUTO: 5.99 K/UL
NEUTROPHILS NFR BLD AUTO: 74.1 %
NONHDLC SERPL-MCNC: 108 MG/DL
PLATELET # BLD AUTO: 253 K/UL
POTASSIUM SERPL-SCNC: 4.1 MMOL/L
PROT SERPL-MCNC: 7.2 G/DL
PT BLD: 12.1 SEC
RBC # BLD: 5.29 M/UL
RBC # FLD: 11.9 %
SARS-COV-2 AB SERPL IA-ACNC: 191 U/ML
SARS-COV-2 AB SERPL QL IA: 65 INDEX
SODIUM SERPL-SCNC: 144 MMOL/L
T PALLIDUM AB SER QL IA: NEGATIVE
TRIGL SERPL-MCNC: 136 MG/DL
WBC # FLD AUTO: 8.09 K/UL

## 2021-03-31 LAB
ANACR T: NEGATIVE
B2 GLYCOPROT1 IGA SERPL IA-ACNC: <5 SAU
B2 GLYCOPROT1 IGG SER-ACNC: <5 SGU
B2 GLYCOPROT1 IGM SER-ACNC: 6.9 SMU
CARDIOLIPIN AB SER IA-ACNC: POSITIVE
CARDIOLIPIN IGM SER-MCNC: 13.9 MPL
CARDIOLIPIN IGM SER-MCNC: <5 GPL
DEPRECATED CARDIOLIPIN IGA SER: <5 APL

## 2021-04-04 LAB — IGA 24H UR QL IFE: NORMAL

## 2021-04-06 ENCOUNTER — APPOINTMENT (OUTPATIENT)
Dept: OTOLARYNGOLOGY | Facility: CLINIC | Age: 26
End: 2021-04-06
Payer: COMMERCIAL

## 2021-04-06 VITALS
TEMPERATURE: 97.9 F | BODY MASS INDEX: 28.35 KG/M2 | WEIGHT: 245 LBS | SYSTOLIC BLOOD PRESSURE: 130 MMHG | HEART RATE: 70 BPM | HEIGHT: 78 IN | DIASTOLIC BLOOD PRESSURE: 90 MMHG

## 2021-04-06 DIAGNOSIS — I67.7 CEREBRAL ARTERITIS, NOT ELSEWHERE CLASSIFIED: ICD-10-CM

## 2021-04-06 PROCEDURE — 31231 NASAL ENDOSCOPY DX: CPT

## 2021-04-06 PROCEDURE — 99212 OFFICE O/P EST SF 10 MIN: CPT | Mod: 25

## 2021-04-06 PROCEDURE — 99072 ADDL SUPL MATRL&STAF TM PHE: CPT

## 2021-04-06 NOTE — PROCEDURE
[FreeTextEntry6] : Nasal Endoscopy (46255)\par \par After informed verbal consent, nasal endoscopy was performed due to anterior rhinoscopy insufficient to account for symptoms. Flexible  scope # 32 was used.  Topical vasoconstrictor and anesthetic was used.  The exam showed a deviated septum to the midline. \par \par The nasal endoscope is passed via the right nasal cavity. The inferior, middle, and superior turbinates responded to vasoconstrictors. The inferior, middle and superior meati were examined. The osteomeatal complex is clear with no polyposis or purulence. The sphenoethmoidal recess is clear with no polyposis or purulence. The choana is patent. \par \par The nasal endoscope is passed via the left nasal cavity. The inferior, middle, and superior turbinates responded to vasoconstrictors. The inferior, middle and superior meati were examined. The osteomeatal complex is clear with no polyposis or purulence. The sphenoethmoidal recess is clear with no polyposis or purulence. The choana is patent.  \par

## 2021-04-06 NOTE — HISTORY OF PRESENT ILLNESS
[de-identified] : Patient presents s/p Balloon sinuplasty states he is feeling better. Nasal congestion has improved. Using Flonase daily with improvement of clicking like sound in the ear.  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 \par Pt has no ear pain, ear drainage, hearing loss, tinnitus, vertigo, nasal congestion, nasal discharge, epistaxis, throat pain, dysphagia or fevers\par \par

## 2021-04-06 NOTE — ASSESSMENT
[FreeTextEntry1] : Patient follows up status post balloon sinuplasty he actually tolerated procedure fairly well he continues to improve which seems to be more normal resolution from effects Covid infection that he had.  Neurologically has been followed by Dr. Blum question of possible's arteritis as an etiology of some of his symptoms which seems to be heading in the right direction is now able to function fairly well and work almost a complete day.  Obviously is feeling much better however still has occasional weird neurological symptoms that can be most likely attributed to effects from Covid.  He is scheduled for an MRI/MRA to further evaluate his cerebral vasculature under the guidance of Dr. Calderon.  He will follow up with us as needed and no further acute ENT interventions indicated at this time could very well be that the balloon sinuplasty may not the reason for his mild improvement at the end of the day but his symptoms seem to have responded and he seems to be heading in the right direction.

## 2021-04-07 ENCOUNTER — OUTPATIENT (OUTPATIENT)
Dept: OUTPATIENT SERVICES | Facility: HOSPITAL | Age: 26
LOS: 1 days | End: 2021-04-07
Payer: COMMERCIAL

## 2021-04-07 ENCOUNTER — APPOINTMENT (OUTPATIENT)
Dept: CV DIAGNOSITCS | Facility: HOSPITAL | Age: 26
End: 2021-04-07

## 2021-04-07 DIAGNOSIS — R55 SYNCOPE AND COLLAPSE: ICD-10-CM

## 2021-04-07 PROCEDURE — 93306 TTE W/DOPPLER COMPLETE: CPT

## 2021-04-07 PROCEDURE — 93306 TTE W/DOPPLER COMPLETE: CPT | Mod: 26

## 2021-04-08 ENCOUNTER — APPOINTMENT (OUTPATIENT)
Dept: OTOLARYNGOLOGY | Facility: CLINIC | Age: 26
End: 2021-04-08

## 2021-04-11 ENCOUNTER — APPOINTMENT (OUTPATIENT)
Dept: MRI IMAGING | Facility: CLINIC | Age: 26
End: 2021-04-11
Payer: COMMERCIAL

## 2021-04-11 ENCOUNTER — OUTPATIENT (OUTPATIENT)
Dept: OUTPATIENT SERVICES | Facility: HOSPITAL | Age: 26
LOS: 1 days | End: 2021-04-11
Payer: COMMERCIAL

## 2021-04-11 ENCOUNTER — RESULT REVIEW (OUTPATIENT)
Age: 26
End: 2021-04-11

## 2021-04-11 DIAGNOSIS — Z00.8 ENCOUNTER FOR OTHER GENERAL EXAMINATION: ICD-10-CM

## 2021-04-11 PROCEDURE — A9585: CPT

## 2021-04-11 PROCEDURE — 70544 MR ANGIOGRAPHY HEAD W/O DYE: CPT | Mod: 26

## 2021-04-11 PROCEDURE — 70549 MR ANGIOGRAPH NECK W/O&W/DYE: CPT | Mod: 26

## 2021-04-11 PROCEDURE — 70544 MR ANGIOGRAPHY HEAD W/O DYE: CPT

## 2021-04-11 PROCEDURE — 70549 MR ANGIOGRAPH NECK W/O&W/DYE: CPT

## 2021-04-12 ENCOUNTER — NON-APPOINTMENT (OUTPATIENT)
Age: 26
End: 2021-04-12

## 2021-04-12 ENCOUNTER — APPOINTMENT (OUTPATIENT)
Dept: OTOLARYNGOLOGY | Facility: CLINIC | Age: 26
End: 2021-04-12
Payer: COMMERCIAL

## 2021-04-12 VITALS
HEIGHT: 78 IN | HEART RATE: 72 BPM | SYSTOLIC BLOOD PRESSURE: 130 MMHG | WEIGHT: 245 LBS | DIASTOLIC BLOOD PRESSURE: 84 MMHG | TEMPERATURE: 98 F | BODY MASS INDEX: 28.35 KG/M2

## 2021-04-12 PROCEDURE — 99072 ADDL SUPL MATRL&STAF TM PHE: CPT

## 2021-04-12 PROCEDURE — 99213 OFFICE O/P EST LOW 20 MIN: CPT

## 2021-04-13 ENCOUNTER — NON-APPOINTMENT (OUTPATIENT)
Age: 26
End: 2021-04-13

## 2021-04-13 ENCOUNTER — APPOINTMENT (OUTPATIENT)
Dept: OPHTHALMOLOGY | Facility: CLINIC | Age: 26
End: 2021-04-13
Payer: COMMERCIAL

## 2021-04-13 PROCEDURE — 99072 ADDL SUPL MATRL&STAF TM PHE: CPT

## 2021-04-13 PROCEDURE — 92014 COMPRE OPH EXAM EST PT 1/>: CPT

## 2021-04-20 ENCOUNTER — APPOINTMENT (OUTPATIENT)
Dept: OTOLARYNGOLOGY | Facility: CLINIC | Age: 26
End: 2021-04-20

## 2021-04-27 ENCOUNTER — APPOINTMENT (OUTPATIENT)
Dept: OTOLARYNGOLOGY | Facility: CLINIC | Age: 26
End: 2021-04-27
Payer: COMMERCIAL

## 2021-04-27 VITALS — HEIGHT: 78 IN | BODY MASS INDEX: 28.35 KG/M2 | WEIGHT: 245 LBS

## 2021-04-27 VITALS
SYSTOLIC BLOOD PRESSURE: 139 MMHG | DIASTOLIC BLOOD PRESSURE: 88 MMHG | TEMPERATURE: 98.3 F | OXYGEN SATURATION: 99 % | HEART RATE: 99 BPM

## 2021-04-27 DIAGNOSIS — Z87.898 PERSONAL HISTORY OF OTHER SPECIFIED CONDITIONS: ICD-10-CM

## 2021-04-27 PROCEDURE — 99203 OFFICE O/P NEW LOW 30 MIN: CPT | Mod: 25

## 2021-04-27 PROCEDURE — 92550 TYMPANOMETRY & REFLEX THRESH: CPT

## 2021-04-27 PROCEDURE — 99072 ADDL SUPL MATRL&STAF TM PHE: CPT

## 2021-04-27 PROCEDURE — 31231 NASAL ENDOSCOPY DX: CPT

## 2021-04-27 NOTE — REVIEW OF SYSTEMS
[Ear Pain] : ear pain [Ear Noises] : ear noises [As Noted in HPI] : as noted in HPI [Nasal Congestion] : nasal congestion [Negative] : Heme/Lymph

## 2021-05-04 ENCOUNTER — APPOINTMENT (OUTPATIENT)
Dept: OPHTHALMOLOGY | Facility: CLINIC | Age: 26
End: 2021-05-04
Payer: COMMERCIAL

## 2021-05-04 ENCOUNTER — OUTPATIENT (OUTPATIENT)
Dept: OUTPATIENT SERVICES | Facility: HOSPITAL | Age: 26
LOS: 1 days | Discharge: HOME | End: 2021-05-04

## 2021-05-04 ENCOUNTER — NON-APPOINTMENT (OUTPATIENT)
Age: 26
End: 2021-05-04

## 2021-05-04 ENCOUNTER — APPOINTMENT (OUTPATIENT)
Dept: NEUROLOGY | Facility: CLINIC | Age: 26
End: 2021-05-04
Payer: COMMERCIAL

## 2021-05-04 VITALS
BODY MASS INDEX: 28.35 KG/M2 | HEART RATE: 71 BPM | HEIGHT: 78 IN | WEIGHT: 245 LBS | SYSTOLIC BLOOD PRESSURE: 123 MMHG | DIASTOLIC BLOOD PRESSURE: 83 MMHG

## 2021-05-04 VITALS — TEMPERATURE: 97.5 F

## 2021-05-04 DIAGNOSIS — D68.9 COAGULATION DEFECT, UNSPECIFIED: ICD-10-CM

## 2021-05-04 DIAGNOSIS — R51.9 HEADACHE, UNSPECIFIED: ICD-10-CM

## 2021-05-04 DIAGNOSIS — H53.8 OTHER VISUAL DISTURBANCES: ICD-10-CM

## 2021-05-04 PROCEDURE — 99214 OFFICE O/P EST MOD 30 MIN: CPT

## 2021-05-04 PROCEDURE — 99072 ADDL SUPL MATRL&STAF TM PHE: CPT

## 2021-05-04 PROCEDURE — 92012 INTRM OPH EXAM EST PATIENT: CPT

## 2021-05-04 RX ORDER — ISOTRETINOIN 40 MG/1
40 CAPSULE ORAL
Qty: 30 | Refills: 0 | Status: DISCONTINUED | COMMUNITY
Start: 2020-12-15 | End: 2021-05-04

## 2021-05-04 RX ORDER — IBUPROFEN 800 MG/1
TABLET, FILM COATED ORAL
Refills: 0 | Status: DISCONTINUED | COMMUNITY
End: 2021-05-04

## 2021-05-04 RX ORDER — SUMATRIPTAN 50 MG/1
50 TABLET, FILM COATED ORAL
Qty: 9 | Refills: 0 | Status: DISCONTINUED | COMMUNITY
Start: 2021-03-17 | End: 2021-05-04

## 2021-05-04 RX ORDER — METHYLPREDNISOLONE 4 MG/1
4 TABLET ORAL
Qty: 21 | Refills: 0 | Status: DISCONTINUED | COMMUNITY
Start: 2021-02-24 | End: 2021-05-04

## 2021-05-04 RX ORDER — AMOXICILLIN AND CLAVULANATE POTASSIUM 875; 125 MG/1; MG/1
875-125 TABLET, COATED ORAL
Qty: 20 | Refills: 0 | Status: DISCONTINUED | COMMUNITY
Start: 2021-03-11 | End: 2021-05-04

## 2021-05-04 RX ORDER — CIPROFLOXACIN AND DEXAMETHASONE 3; 1 MG/ML; MG/ML
0.3-0.1 SUSPENSION/ DROPS AURICULAR (OTIC)
Qty: 8 | Refills: 0 | Status: DISCONTINUED | COMMUNITY
Start: 2020-10-05 | End: 2021-05-04

## 2021-05-04 RX ORDER — ACETAMINOPHEN 500 MG/1
TABLET ORAL
Refills: 0 | Status: DISCONTINUED | COMMUNITY
End: 2021-05-04

## 2021-05-04 RX ORDER — METHYLPHENIDATE HYDROCHLORIDE 10 MG/1
10 TABLET ORAL
Refills: 0 | Status: DISCONTINUED | COMMUNITY
Start: 2021-02-05 | End: 2021-05-04

## 2021-05-04 RX ORDER — AMOXICILLIN 875 MG/1
875 TABLET, FILM COATED ORAL
Qty: 14 | Refills: 0 | Status: DISCONTINUED | COMMUNITY
Start: 2021-03-29 | End: 2021-05-04

## 2021-05-04 RX ORDER — AZITHROMYCIN 250 MG/1
250 TABLET, FILM COATED ORAL
Qty: 6 | Refills: 0 | Status: DISCONTINUED | COMMUNITY
Start: 2021-01-15 | End: 2021-05-04

## 2021-05-04 RX ORDER — MULTIVIT-MIN/FOLIC/VIT K/LYCOP 400-300MCG
25 MCG TABLET ORAL DAILY
Refills: 0 | Status: DISCONTINUED | COMMUNITY
Start: 2021-02-05 | End: 2021-05-04

## 2021-05-04 NOTE — PHYSICAL EXAM
[] : septum deviated bilaterally [Normal] : mucosa is normal [Midline] : trachea located in midline position [de-identified] : rhinitis severe rt

## 2021-05-04 NOTE — HISTORY OF PRESENT ILLNESS
[de-identified] : 26 years old male patient with history of Persistent ear pressure nasal congestion for the past couple of months.   Patient is present today in the office with Eustachium Tube  Dysfunction.  Rhinitis.  Patient with history of COVID-19 in 02/2021.  Status Post Balloon Sinu Plasty  3 weeks ago.  Edema

## 2021-05-04 NOTE — REASON FOR VISIT
[Initial Evaluation] : an initial evaluation for [FreeTextEntry2] : Persistent ear pressure nasal congestion for the past couple of months.   Patient states his level of severity is a level 5 out of 10 and it occurs constant.  Patient states nothing helps to improve or worsens his Persistent ear pressure nasal congestion for the past couple of months.

## 2021-05-04 NOTE — REVIEW OF SYSTEMS
[Emotional Problems] : emotional problems [Numbness] : numbness [Tingling] : tingling [Tension Headache] : tension-type headaches [Eyesight Problems] : eyesight problems [Loss Of Hearing] : hearing loss [Palpitations] : palpitations [Negative] : Heme/Lymph

## 2021-05-04 NOTE — HISTORY OF PRESENT ILLNESS
[FreeTextEntry1] : Mr. Shawn Maguire return to the office having been initially evaluated on March 18, 2021.  He is a 26 year right-handed patient who presented with a myriad of somatic symptoms following COVID-19 infection in January 2021.  He complains of right retro-orbital, bitemporal and occipital headaches brain fog, right ear popping, right eye visual blurring, bifacial numbness and right foot tingling.  CT of the brain revealed paranasal sinus mucosal thickening.  Ophthalmologic and neurologic evaluations were unremarkable.  His neurologic evaluation was unremarkable.\par \par Since last seen, he underwent multiple studies.  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  Serologic evaluation was notable for normal coagulation profile.  IgM cardiolipin antibody however was positive.  Beta 2 glycoprotein screen was negative.  He was begun on aspirin 81 mg/day.  He is scheduled to see hematology soon.\par \par MRA of the neck and brain were unremarkable.\par \par He underwent a balloon sinuplasty because of persistent right ear symptoms.\par \par Mr. Maguire is under the care of Dr. Obi Willard, ophthalmology, Matt Villeda, otolaryngology, Andi Baltazar, cardiology and Farhad Morrow, internal medicine.  He is also seeing a psychologist.\par \par He complains of persistent right ear popping and occasionally the left.  He has vague facial weakness.  His brain fog is improved.  He finds himself squinting quite a bit.  He has transient tingling in his left hand, left foot, left forearm.  His right foot tingling resolved.  He complains of constant tinnitus and occasional transient loss of hearing.  He still has right retro-orbital discomfort.  His palpitations have diminished.  \par \par

## 2021-05-04 NOTE — CONSULT LETTER
[Dear  ___] : Dear  [unfilled], [Consult Letter:] : I had the pleasure of evaluating your patient, [unfilled]. [Please see my note below.] : Please see my note below. [Consult Closing:] : Thank you very much for allowing me to participate in the care of this patient.  If you have any questions, please do not hesitate to contact me. [Sincerely,] : Sincerely, [FreeTextEntry3] : Matt Villeda MD, FACS\par Professor of Otolaryngology, [employer] at [facility]\par Director, Center for [facility] Disorders, Department of Otolaryngology, [facility]\par , Head & Neck Service Line, [facility]\par

## 2021-05-04 NOTE — ASSESSMENT
[FreeTextEntry1] : Mr. Maguire is a 26-year-old who suffered a bout of COVID-19 in January 2021.  He has persistent/fluctuating symptoms including right ear popping, tinnitus, improving brain fog, episodic limb tingling, palpitations and right retro-orbital discomfort.  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  He has a low titer IgM cardiolipin antibody.  PTT was normal and lupus anticoagulant was negative.  He is being treated with aspirin 81 mg/day.\par \par From a neurologic perspective, I suggested cautious observation.  He will follow up with his other consultants.  He will return to the office in 10 to 12 weeks for routine follow-up.  Telephone contact will be maintained in the meantime.

## 2021-05-04 NOTE — PROCEDURE
[Congested] : congested [Image(s) Captured] : image(s) captured and filed [Topical Lidocaine] : topical lidocaine [Oxymetazoline HCl] : oxymetazoline HCl [Flexible Endoscope] : examined with the flexible endoscope [Serial Number: ___] : Serial Number: [unfilled] [de-identified] : Right side Eustachium Tube  Dysfunction.  Rhinitis.

## 2021-05-04 NOTE — PHYSICAL EXAM
[FreeTextEntry1] : Constitutional:  Patient was well-developed, well-nourished and in no acute distress. \par \par Head:  Normocephalic, atraumatic. Tympanic membranes were clear. \par \par Neck:  Supple with full range of motion. \par \par Cardiovascular:  Cardiac rhythm was regular without murmur. There were no carotid bruits. Peripheral pulses were full and symmetric. \par \par Respiratory:  Lungs were clear. \par \par Abdomen:  Soft and nontender. \par \par Spine:  Nontender. \par \par Skin:  There were no rashes. \par \par NEUROLOGICAL EXAMINATION:\par \par Mental Status: Patient was alert and oriented. Speech was fluent. There was no dysarthria. \par \par Cranial Nerves: \par \par II: Visual acuity was 20/20-1 in the right eye and 20/20 in left eye with near card. Pupils were equal and reactive. Visual fields were full. Funduscopic examination was normal. \par \par III, IV, VI:  Eye movements were full without nystagmus. \par \par V: Facial sensation was intact. \par \par VII: Facial strength was normal. \par \par VIII: Hearing was equal. \par \par IX, X: Palatal movement was normal. Phonation was normal. \par \par XI: Sternocleidomastoids and trapezii were normal. \par \par XII: Tongue was midline and movements normal. There was no lingual atrophy or fasciculations. \par \par Motor Examination: Muscle bulk, tone and strength were normal. \par \par Sensory Examination: Pinprick, vibration and joint position sense were intact. \par \par Reflexes: DTRs were 2 throughout. \par \par Plantar Responses: Plantar responses were flexor. \par \par Coordination/Cerebellar Function: There was no dysmetria on finger to nose or heel to shin testing. \par \par Gait/Stance: Gait and tandem were normal. Romberg was negative.\par

## 2021-05-04 NOTE — CONSULT LETTER
[Dear  ___] : Dear  [unfilled], [Consult Letter:] : I had the pleasure of evaluating your patient, [unfilled]. [Please see my note below.] : Please see my note below. [Consult Closing:] : Thank you very much for allowing me to participate in the care of this patient.  If you have any questions, please do not hesitate to contact me. [Sincerely,] : Sincerely, [FreeTextEntry3] : Jerry Anthony MD\par  [DrStevan  ___] : Dr. KWAN [DrStevan ___] : Dr. KWAN

## 2021-05-05 ENCOUNTER — RESULT REVIEW (OUTPATIENT)
Age: 26
End: 2021-05-05

## 2021-05-05 ENCOUNTER — APPOINTMENT (OUTPATIENT)
Dept: HEMATOLOGY ONCOLOGY | Facility: CLINIC | Age: 26
End: 2021-05-05
Payer: COMMERCIAL

## 2021-05-05 ENCOUNTER — APPOINTMENT (OUTPATIENT)
Dept: OPHTHALMOLOGY | Facility: CLINIC | Age: 26
End: 2021-05-05
Payer: COMMERCIAL

## 2021-05-05 ENCOUNTER — NON-APPOINTMENT (OUTPATIENT)
Age: 26
End: 2021-05-05

## 2021-05-05 VITALS
HEIGHT: 77.48 IN | RESPIRATION RATE: 18 BRPM | DIASTOLIC BLOOD PRESSURE: 78 MMHG | BODY MASS INDEX: 29.25 KG/M2 | HEART RATE: 80 BPM | TEMPERATURE: 96.3 F | WEIGHT: 250.22 LBS | SYSTOLIC BLOOD PRESSURE: 122 MMHG | OXYGEN SATURATION: 99 %

## 2021-05-05 DIAGNOSIS — Z83.49 FAMILY HISTORY OF OTHER ENDOCRINE, NUTRITIONAL AND METABOLIC DISEASES: ICD-10-CM

## 2021-05-05 DIAGNOSIS — Z86.59 PERSONAL HISTORY OF OTHER MENTAL AND BEHAVIORAL DISORDERS: ICD-10-CM

## 2021-05-05 DIAGNOSIS — J45.990 EXERCISE INDUCED BRONCHOSPASM: ICD-10-CM

## 2021-05-05 DIAGNOSIS — Z82.49 FAMILY HISTORY OF ISCHEMIC HEART DISEASE AND OTHER DISEASES OF THE CIRCULATORY SYSTEM: ICD-10-CM

## 2021-05-05 DIAGNOSIS — Z80.1 FAMILY HISTORY OF MALIGNANT NEOPLASM OF TRACHEA, BRONCHUS AND LUNG: ICD-10-CM

## 2021-05-05 LAB
BASOPHILS # BLD AUTO: 0.07 K/UL — SIGNIFICANT CHANGE UP (ref 0–0.2)
BASOPHILS NFR BLD AUTO: 1.1 % — SIGNIFICANT CHANGE UP (ref 0–2)
EOSINOPHIL # BLD AUTO: 0.57 K/UL — HIGH (ref 0–0.5)
EOSINOPHIL NFR BLD AUTO: 9 % — HIGH (ref 0–6)
HCT VFR BLD CALC: 44.7 % — SIGNIFICANT CHANGE UP (ref 39–50)
HGB BLD-MCNC: 16.1 G/DL — SIGNIFICANT CHANGE UP (ref 13–17)
IMM GRANULOCYTES NFR BLD AUTO: 0.5 % — SIGNIFICANT CHANGE UP (ref 0–1.5)
LYMPHOCYTES # BLD AUTO: 2.18 K/UL — SIGNIFICANT CHANGE UP (ref 1–3.3)
LYMPHOCYTES # BLD AUTO: 34.3 % — SIGNIFICANT CHANGE UP (ref 13–44)
MCHC RBC-ENTMCNC: 30.7 PG — SIGNIFICANT CHANGE UP (ref 27–34)
MCHC RBC-ENTMCNC: 36 G/DL — SIGNIFICANT CHANGE UP (ref 32–36)
MCV RBC AUTO: 85.1 FL — SIGNIFICANT CHANGE UP (ref 80–100)
MONOCYTES # BLD AUTO: 0.55 K/UL — SIGNIFICANT CHANGE UP (ref 0–0.9)
MONOCYTES NFR BLD AUTO: 8.6 % — SIGNIFICANT CHANGE UP (ref 2–14)
NEUTROPHILS # BLD AUTO: 2.96 K/UL — SIGNIFICANT CHANGE UP (ref 1.8–7.4)
NEUTROPHILS NFR BLD AUTO: 46.5 % — SIGNIFICANT CHANGE UP (ref 43–77)
NRBC # BLD: 0 /100 WBCS — SIGNIFICANT CHANGE UP (ref 0–0)
PLATELET # BLD AUTO: 223 K/UL — SIGNIFICANT CHANGE UP (ref 150–400)
RBC # BLD: 5.25 M/UL — SIGNIFICANT CHANGE UP (ref 4.2–5.8)
RBC # FLD: 11.5 % — SIGNIFICANT CHANGE UP (ref 10.3–14.5)
WBC # BLD: 6.36 K/UL — SIGNIFICANT CHANGE UP (ref 3.8–10.5)
WBC # FLD AUTO: 6.36 K/UL — SIGNIFICANT CHANGE UP (ref 3.8–10.5)

## 2021-05-05 PROCEDURE — 99072 ADDL SUPL MATRL&STAF TM PHE: CPT

## 2021-05-05 PROCEDURE — 99205 OFFICE O/P NEW HI 60 MIN: CPT

## 2021-05-05 PROCEDURE — 92012 INTRM OPH EXAM EST PATIENT: CPT

## 2021-05-05 PROCEDURE — 92060 SENSORIMOTOR EXAMINATION: CPT

## 2021-05-06 ENCOUNTER — NON-APPOINTMENT (OUTPATIENT)
Age: 26
End: 2021-05-06

## 2021-05-12 PROBLEM — Z83.49 FAMILY HISTORY OF OBESITY: Status: ACTIVE | Noted: 2021-05-12

## 2021-05-12 PROBLEM — J45.990 EXERCISE-INDUCED ASTHMA: Status: RESOLVED | Noted: 2021-05-12 | Resolved: 2021-05-12

## 2021-05-12 PROBLEM — Z86.59 HISTORY OF ATTENTION DEFICIT HYPERACTIVITY DISORDER (ADHD): Status: RESOLVED | Noted: 2021-05-12 | Resolved: 2021-05-12

## 2021-05-12 PROBLEM — Z82.49 FAMILY HISTORY OF CONGESTIVE HEART FAILURE: Status: ACTIVE | Noted: 2021-05-12

## 2021-05-12 PROBLEM — Z82.49 FAMILY HISTORY OF MYOCARDIAL INFARCTION: Status: ACTIVE | Noted: 2021-05-12

## 2021-05-12 PROBLEM — Z80.1 FAMILY HISTORY OF LUNG CANCER: Status: ACTIVE | Noted: 2021-05-12

## 2021-05-12 RX ORDER — CHOLECALCIFEROL (VITAMIN D3) 125 MCG
125 MCG CAPSULE ORAL
Refills: 0 | Status: ACTIVE | COMMUNITY

## 2021-05-12 RX ORDER — MULTIVIT-MIN/IRON/FOLIC ACID/K 18-600-40
CAPSULE ORAL
Refills: 0 | Status: ACTIVE | COMMUNITY

## 2021-05-12 RX ORDER — MULTIVITAMIN
TABLET ORAL DAILY
Refills: 0 | Status: ACTIVE | COMMUNITY

## 2021-05-13 ENCOUNTER — APPOINTMENT (OUTPATIENT)
Dept: OTOLARYNGOLOGY | Facility: CLINIC | Age: 26
End: 2021-05-13
Payer: COMMERCIAL

## 2021-05-13 VITALS
RESPIRATION RATE: 14 BRPM | SYSTOLIC BLOOD PRESSURE: 134 MMHG | HEART RATE: 76 BPM | DIASTOLIC BLOOD PRESSURE: 91 MMHG | OXYGEN SATURATION: 98 % | TEMPERATURE: 97.8 F

## 2021-05-13 DIAGNOSIS — J32.4 CHRONIC PANSINUSITIS: ICD-10-CM

## 2021-05-13 PROCEDURE — 99072 ADDL SUPL MATRL&STAF TM PHE: CPT

## 2021-05-13 PROCEDURE — 99213 OFFICE O/P EST LOW 20 MIN: CPT

## 2021-05-13 NOTE — ED PROVIDER NOTE - GASTROINTESTINAL, MLM
Lymphedema Treatment Note      Patient Name: Adolfo Heck  YOB: 1967  MRN: [id number]  Referring Physician: Dr. Emilia Rodriguez MD  Diagnosis:   1. Lymphedema of both lower extremities    2. Morbid obesity with BMI of 60.0-69.9, adult (CMS/[facility])    3. Chronic venous insufficiency      Reason for Visit:  Patient presents to lymphedema clinic for treatment of BLE lymphedema. Patient is accompanied by no one.    Subjective:  Patient states that bandages felt very tight below knees B, did have some cramping in BLE. No areas of sharp pain or discomfort.    Objective Exam:  Patient without irritation or localized redness upon removal of bandages.    Patient's swelling below knees B is well-managed. Patient does continue to have significant swelling in B thighs, R > L. Do note further softening of R thigh fibrosis, though without significant volume reduction in this date. L thigh continues to remain soft.     Treatment:  Patient 30 minutes late to session, MLD deferred.    Removed bandages, washed B LE's with soap and water.    Knee high compression bandaging to L LE and thigh high compression bandaging to RLE x 45 min utilizing: stockinet, 10 cm Artiflex, 1/2\" foam BLE pads,  8 cm (2), 10 cm (4). To R thigh, used additional stockinet, 10 cm artiflex, blue foam, and coban wrap. Patient instructed in bandaging precautions and care.  Patient did not bring in any of her bandages as she forgot them , so issued all new for lower legs this date .     Assessment:   Patient continues to have overall well-managed volume below knees B. Significant swelling remains in B thighs, limited by sliding of bandages between sessions and patient not using pump of late. Emphasized importance of patient using compression pump regularly, including and up to every day. Patient vocalized understanding. Once patient has achieved sufficient reduction in B thighs, will be measured for compression capris for long term management of  lymphedema. Patient will benefit from continued lymphedema therapy 2x/week until that time.    Plan:  Continue with compression bandaging 2x/week until appropriate to be measured for compression garments.     Signature:  Alisia Trent, PT, DPT  5/13/2021 9:10 AM         Abdomen soft, non-tender, no guarding.

## 2021-05-13 NOTE — REASON FOR VISIT
[Subsequent Evaluation] : a subsequent evaluation for [FreeTextEntry2] : Persistent ear pressure nasal congestion for the past couple of months.   Eustachium Tube  Dysfunction.  Rhinitis.

## 2021-05-13 NOTE — CONSULT LETTER
[Please see my note below.] : Please see my note below. [Consult Closing:] : Thank you very much for allowing me to participate in the care of this patient.  If you have any questions, please do not hesitate to contact me. [Sincerely,] : Sincerely, [FreeTextEntry3] : Matt Villeda MD, FACS\par Professor of Otolaryngology, [facility] [employer] at [facility]\par Director, Center for Sleep Disorders, Department of Otolaryngology, [facility]\par , Head & Neck Service Line, [facility]\par

## 2021-05-13 NOTE — HISTORY OF PRESENT ILLNESS
[de-identified] : 26 years old male patient with history of Persistent ear pressure nasal congestion for the past couple of months.   Patient is present today in the office with Eustachium Tube  Dysfunction.  Rhinitis.  Patient with history of COVID-19 in 02/2021.  Status Post Balloon Sinu Plasty  3 weeks ago.  Edema

## 2021-05-13 NOTE — PHYSICAL EXAM
[] : septum deviated bilaterally [Midline] : trachea located in midline position [Normal] : no rashes [de-identified] : rhinitis severe rt

## 2021-06-09 ENCOUNTER — APPOINTMENT (OUTPATIENT)
Dept: OTOLARYNGOLOGY | Facility: CLINIC | Age: 26
End: 2021-06-09

## 2021-06-10 ENCOUNTER — APPOINTMENT (OUTPATIENT)
Dept: OTOLARYNGOLOGY | Facility: CLINIC | Age: 26
End: 2021-06-10
Payer: COMMERCIAL

## 2021-06-10 VITALS
HEART RATE: 74 BPM | TEMPERATURE: 98.3 F | DIASTOLIC BLOOD PRESSURE: 86 MMHG | OXYGEN SATURATION: 97 % | SYSTOLIC BLOOD PRESSURE: 131 MMHG

## 2021-06-10 DIAGNOSIS — H69.81 OTHER SPECIFIED DISORDERS OF EUSTACHIAN TUBE, RIGHT EAR: ICD-10-CM

## 2021-06-10 DIAGNOSIS — J31.0 CHRONIC RHINITIS: ICD-10-CM

## 2021-06-10 PROCEDURE — 99213 OFFICE O/P EST LOW 20 MIN: CPT

## 2021-06-14 ENCOUNTER — APPOINTMENT (OUTPATIENT)
Dept: OPHTHALMOLOGY | Facility: CLINIC | Age: 26
End: 2021-06-14
Payer: COMMERCIAL

## 2021-06-14 ENCOUNTER — NON-APPOINTMENT (OUTPATIENT)
Age: 26
End: 2021-06-14

## 2021-06-14 ENCOUNTER — APPOINTMENT (OUTPATIENT)
Dept: INTERNAL MEDICINE | Facility: CLINIC | Age: 26
End: 2021-06-14

## 2021-06-14 PROCEDURE — 92134 CPTRZ OPH DX IMG PST SGM RTA: CPT

## 2021-06-14 PROCEDURE — 92014 COMPRE OPH EXAM EST PT 1/>: CPT

## 2021-06-15 ENCOUNTER — LABORATORY RESULT (OUTPATIENT)
Age: 26
End: 2021-06-15

## 2021-06-15 ENCOUNTER — APPOINTMENT (OUTPATIENT)
Dept: OPHTHALMOLOGY | Facility: CLINIC | Age: 26
End: 2021-06-15

## 2021-06-15 ENCOUNTER — NON-APPOINTMENT (OUTPATIENT)
Age: 26
End: 2021-06-15

## 2021-06-15 ENCOUNTER — APPOINTMENT (OUTPATIENT)
Dept: INTERNAL MEDICINE | Facility: CLINIC | Age: 26
End: 2021-06-15
Payer: COMMERCIAL

## 2021-06-15 VITALS
WEIGHT: 252 LBS | DIASTOLIC BLOOD PRESSURE: 80 MMHG | RESPIRATION RATE: 16 BRPM | HEIGHT: 78 IN | SYSTOLIC BLOOD PRESSURE: 120 MMHG | BODY MASS INDEX: 29.16 KG/M2 | TEMPERATURE: 98.3 F | HEART RATE: 76 BPM

## 2021-06-15 DIAGNOSIS — Z00.00 ENCOUNTER FOR GENERAL ADULT MEDICAL EXAMINATION W/OUT ABNORMAL FINDINGS: ICD-10-CM

## 2021-06-15 DIAGNOSIS — E55.9 VITAMIN D DEFICIENCY, UNSPECIFIED: ICD-10-CM

## 2021-06-15 DIAGNOSIS — Z78.9 OTHER SPECIFIED HEALTH STATUS: ICD-10-CM

## 2021-06-15 DIAGNOSIS — R53.83 OTHER FATIGUE: ICD-10-CM

## 2021-06-15 PROCEDURE — G0444 DEPRESSION SCREEN ANNUAL: CPT | Mod: 59

## 2021-06-15 PROCEDURE — 99385 PREV VISIT NEW AGE 18-39: CPT | Mod: 25

## 2021-06-15 PROCEDURE — 93000 ELECTROCARDIOGRAM COMPLETE: CPT | Mod: 59

## 2021-06-15 RX ORDER — FLUTICASONE PROPIONATE 50 UG/1
50 SPRAY, METERED NASAL
Qty: 48 | Refills: 3 | Status: DISCONTINUED | COMMUNITY
Start: 2021-04-05 | End: 2021-06-15

## 2021-06-15 NOTE — PHYSICAL EXAM
[No Acute Distress] : no acute distress [Well Nourished] : well nourished [Well Developed] : well developed [Well-Appearing] : well-appearing [Normal Sclera/Conjunctiva] : normal sclera/conjunctiva [PERRL] : pupils equal round and reactive to light [Normal Outer Ear/Nose] : the outer ears and nose were normal in appearance [EOMI] : extraocular movements intact [Normal Oropharynx] : the oropharynx was normal [Normal TMs] : both tympanic membranes were normal [No JVD] : no jugular venous distention [No Lymphadenopathy] : no lymphadenopathy [Supple] : supple [Thyroid Normal, No Nodules] : the thyroid was normal and there were no nodules present [No Respiratory Distress] : no respiratory distress  [No Accessory Muscle Use] : no accessory muscle use [Clear to Auscultation] : lungs were clear to auscultation bilaterally [Normal Rate] : normal rate  [Regular Rhythm] : with a regular rhythm [Normal S1, S2] : normal S1 and S2 [No Murmur] : no murmur heard [No Carotid Bruits] : no carotid bruits [No Abdominal Bruit] : a ~M bruit was not heard ~T in the abdomen [No Varicosities] : no varicosities [Pedal Pulses Present] : the pedal pulses are present [No Edema] : there was no peripheral edema [No Palpable Aorta] : no palpable aorta [No Extremity Clubbing/Cyanosis] : no extremity clubbing/cyanosis [Soft] : abdomen soft [Non Tender] : non-tender [Non-distended] : non-distended [No Masses] : no abdominal mass palpated [No HSM] : no HSM [No Hernias] : no hernias [Normal Bowel Sounds] : normal bowel sounds [Penis Abnormality] : normal circumcised penis [Testes Tenderness] : no tenderness of the testes [Testes Mass (___cm)] : there were no testicular masses [Normal Supraclavicular Nodes] : no supraclavicular lymphadenopathy [Normal Axillary Nodes] : no axillary lymphadenopathy [Normal Posterior Cervical Nodes] : no posterior cervical lymphadenopathy [Normal Anterior Cervical Nodes] : no anterior cervical lymphadenopathy [Normal Inguinal Nodes] : no inguinal lymphadenopathy [Normal Femoral Nodes] : no femoral lymphadenopathy [No CVA Tenderness] : no CVA  tenderness [No Spinal Tenderness] : no spinal tenderness [No Joint Swelling] : no joint swelling [Grossly Normal Strength/Tone] : grossly normal strength/tone [No Rash] : no rash [Coordination Grossly Intact] : coordination grossly intact [No Focal Deficits] : no focal deficits [Normal Gait] : normal gait [Normal Affect] : the affect was normal [Deep Tendon Reflexes (DTR)] : deep tendon reflexes were 2+ and symmetric [Normal Insight/Judgement] : insight and judgment were intact [Normal] : affect was normal and insight and judgment were intact

## 2021-06-15 NOTE — REVIEW OF SYSTEMS
[Palpitations] : palpitations [Shortness Of Breath] : shortness of breath [Negative] : Psychiatric [FreeTextEntry3] : seeing optho - floaters [FreeTextEntry4] : tinnitus [FreeTextEntry5] : Right sided chest pains [de-identified] : saw hematology

## 2021-06-15 NOTE — ASSESSMENT
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

## 2021-06-15 NOTE — HEALTH RISK ASSESSMENT
[Fair] :  ~his/her~ mood as fair [Yes] : Yes [2 - 3 times a week (3 pts)] : 2 - 3  times a week (3 points) [3 or 4 (1 pt)] : 3 or 4  (1 point) [No falls in past year] : Patient reported no falls in the past year [1] : 2) Feeling down, depressed, or hopeless for several days (1) [HIV Test offered] : HIV Test offered [Hepatitis C test offered] : Hepatitis C test offered [None] : None [With Family] : lives with family [Employed] : employed [Single] : single [Sexually Active] : sexually active [Feels Safe at Home] : Feels safe at home [Fully functional (bathing, dressing, toileting, transferring, walking, feeding)] : Fully functional (bathing, dressing, toileting, transferring, walking, feeding) [Fully functional (using the telephone, shopping, preparing meals, housekeeping, doing laundry, using] : Fully functional and needs no help or supervision to perform IADLs (using the telephone, shopping, preparing meals, housekeeping, doing laundry, using transportation, managing medications and managing finances) [Smoke Detector] : smoke detector [Carbon Monoxide Detector] : carbon monoxide detector [Seat Belt] :  uses seat belt [Sunscreen] : uses sunscreen [] : No [de-identified] : None recently [de-identified] : Healthy [[id number]] : 2 [Change in mental status noted] : No change in mental status noted [FreeTextEntry2] :

## 2021-06-15 NOTE — HISTORY OF PRESENT ILLNESS
[FreeTextEntry1] : Well visit - \par Had Covid - 1/2021 - has had some persistent symptoms\par Fatigue -and now feels more SOB

## 2021-06-16 ENCOUNTER — NON-APPOINTMENT (OUTPATIENT)
Age: 26
End: 2021-06-16

## 2021-06-16 LAB
25(OH)D3 SERPL-MCNC: 53.9 NG/ML
ALBUMIN SERPL ELPH-MCNC: 4.4 G/DL
ALP BLD-CCNC: 111 U/L
ALT SERPL-CCNC: 28 U/L
ANION GAP SERPL CALC-SCNC: 16 MMOL/L
APPEARANCE: ABNORMAL
AST SERPL-CCNC: 24 U/L
B BURGDOR IGG+IGM SER QL IB: NORMAL
BASOPHILS # BLD AUTO: 0.06 K/UL
BASOPHILS NFR BLD AUTO: 0.9 %
BILIRUB SERPL-MCNC: 0.3 MG/DL
BILIRUBIN URINE: NEGATIVE
BLOOD URINE: NEGATIVE
BUN SERPL-MCNC: 14 MG/DL
CALCIUM SERPL-MCNC: 9.8 MG/DL
CHLORIDE SERPL-SCNC: 102 MMOL/L
CHOLEST SERPL-MCNC: 153 MG/DL
CO2 SERPL-SCNC: 24 MMOL/L
COLOR: ABNORMAL
CREAT SERPL-MCNC: 1.02 MG/DL
EOSINOPHIL # BLD AUTO: 0.36 K/UL
EOSINOPHIL NFR BLD AUTO: 5.6 %
ESTIMATED AVERAGE GLUCOSE: 94 MG/DL
GLUCOSE QUALITATIVE U: NEGATIVE
GLUCOSE SERPL-MCNC: 96 MG/DL
HBA1C MFR BLD HPLC: 4.9 %
HCT VFR BLD CALC: 45.9 %
HCV AB SER QL: NONREACTIVE
HCV S/CO RATIO: 0.23 S/CO
HDLC SERPL-MCNC: 39 MG/DL
HGB BLD-MCNC: 15.4 G/DL
HIV1+2 AB SPEC QL IA.RAPID: NONREACTIVE
IMM GRANULOCYTES NFR BLD AUTO: 0.2 %
KETONES URINE: NEGATIVE
LDLC SERPL CALC-MCNC: 81 MG/DL
LEUKOCYTE ESTERASE URINE: NEGATIVE
LYMPHOCYTES # BLD AUTO: 2.14 K/UL
LYMPHOCYTES NFR BLD AUTO: 33.3 %
MAN DIFF?: NORMAL
MCHC RBC-ENTMCNC: 30.5 PG
MCHC RBC-ENTMCNC: 33.6 GM/DL
MCV RBC AUTO: 90.9 FL
MONOCYTES # BLD AUTO: 0.59 K/UL
MONOCYTES NFR BLD AUTO: 9.2 %
NEUTROPHILS # BLD AUTO: 3.26 K/UL
NEUTROPHILS NFR BLD AUTO: 50.8 %
NITRITE URINE: NEGATIVE
NONHDLC SERPL-MCNC: 114 MG/DL
PH URINE: 7.5
PLATELET # BLD AUTO: 256 K/UL
POTASSIUM SERPL-SCNC: 4.3 MMOL/L
PROT SERPL-MCNC: 7 G/DL
PROTEIN URINE: NORMAL
RBC # BLD: 5.05 M/UL
RBC # FLD: 12.4 %
SODIUM SERPL-SCNC: 141 MMOL/L
SPECIFIC GRAVITY URINE: 1.02
TRIGL SERPL-MCNC: 166 MG/DL
TSH SERPL-ACNC: 1 UIU/ML
UROBILINOGEN URINE: NORMAL
VIT B12 SERPL-MCNC: 573 PG/ML
WBC # FLD AUTO: 6.42 K/UL

## 2021-06-17 ENCOUNTER — NON-APPOINTMENT (OUTPATIENT)
Age: 26
End: 2021-06-17

## 2021-06-17 ENCOUNTER — APPOINTMENT (OUTPATIENT)
Dept: OPHTHALMOLOGY | Facility: CLINIC | Age: 26
End: 2021-06-17
Payer: COMMERCIAL

## 2021-06-17 PROCEDURE — 92014 COMPRE OPH EXAM EST PT 1/>: CPT

## 2021-06-17 PROCEDURE — 92012 INTRM OPH EXAM EST PATIENT: CPT

## 2021-06-17 PROCEDURE — 92083 EXTENDED VISUAL FIELD XM: CPT

## 2021-06-17 PROCEDURE — 92020 GONIOSCOPY: CPT

## 2021-06-17 NOTE — PHYSICAL EXAM
[Fully active, able to carry on all pre-disease performance without restriction] : Status 0 - Fully active, able to carry on all pre-disease performance without restriction [Normal] : affect appropriate [de-identified] : No cervical, axillary or inguinal LAD noted

## 2021-06-17 NOTE — REVIEW OF SYSTEMS
[Patient Intake Form Reviewed] : Patient intake form was reviewed [Vision Problems] : vision problems [Chest Pain] : chest pain [Palpitations] : palpitations [SOB on Exertion] : shortness of breath during exertion [Negative] : Allergic/Immunologic [Diarrhea] : diarrhea [Anxiety] : anxiety [FreeTextEntry4] : tinnitus, ear pain/pressure [FreeTextEntry7] : nausea [de-identified] : headaches

## 2021-06-17 NOTE — HISTORY OF PRESENT ILLNESS
[de-identified] : Initial Consultation 2021:\par Referred by Dr. Anthony, neurology\par CC: Hypercoagulability\par \par HPI:\par 26 year old man with no significant past medical history here for evaluation of hypercoagulability.  Patient was in his usual state of health until 2021 when he developed cold symptoms.  He took a dose of Sudafed and symptoms progressed to SOB and palpitations.  He went to [facility] for evaluation and was found to be positive for COVID and have a negative chest x-ray.  Patient reports that his whole family had COVID.  After two weeks, most of his symptoms resolved except for right ear pressure, tinnitus, clicking.  He was evaluated by ENT and given Flonase without improvement.  He was re-evaluated by ENT and given Medrol dose pack.  After two days of steroids, patient developed pounding headaches, blurry vision, brain fog (trouble forming sentences).  He underwent CT head which showed sinusitis.  He completed a course of antibiotics without improvement in symptoms.  Patient also underwent balloon sinuplasty 2021 without any improvement in symptoms.  He developed facial weakness, numbness, limb paresthesias and intermittent limb weakness.  He was evaluated by neurology and underwent MRI/MRA and blood work.  Neurology sent a hypercoagulable work-up which showed a mild elevation in anticardiolipin antibody.  PT/INR, PTT, fibrinogen, d-dimer, homocysteine, CRP, DRVVT and B2 glycoprotein were all within normal limits. He denies any history of DVT or PE.   \par \par Patient feels a lot better overall.  He still c/o headaches, blurry vision/floaters, occasional chest pain/palpitations/SOB/nausea/diarrhea/tinnitus/ear pain.  He has mild chronic back pain from swimming. Patient denies any fever/chills, abdominal pain, vomiting, dizziness, night sweats, swollen glands, rashes, calf pain, lower extremity edema or new skeletal pain. He is having a lot of anxiety from his symptoms and is seeing a psychologist weekly and also sees psychiatry. He is back at work remotely. \par \par PMHx:\par COVID infection 2021\par Borderline hypertension \par Exercise induced asthma- when younger\par ADHD- off Ritalin\par \par PSHx:\par Balloon sinuplasty 2021\par \par Hospitalizations:\par Denies\par \par Medications:\par See List\par \par Allergies:\par NKDA\par Denies environmental allergies\par \par Social History:\par Single never been \par Tech consultant\par Graduated from [employer] in 2019. 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 \par Lives with parents/brother\par Denies smoking\par Drinks socially once per week\par Born in [place]\par \par Family History:\par Mother alive with obesity, miscarriage (does not know which trimester)\par Father alive with obesity\par 2 brothers and 1 sister alive and well\par Paternal grandmother  from lung cancer\par Maternal grandfather alive with MI\par Paternal grandfather alive with heart failure\par Denies h/o CVA, DVT/PE\par \par Healthcare Maintenance:\par Dr. Farhad Morrow- primary care doctor- last seen years ago\par Dr. Jerry Anthony- neurology\par Dr. Andi Baltazar- cardiology- seen for palpitations post COVID infection.  Had recent EKG/echo\par Dr. Matt Villeda- ENT\par Dr. Obi Willard- ophthalmology- last seen yesterday\par Seeing neuro-ophthalmology today\par Denies h/o endoscopy/colonoscopy\par MRA neck/brain- normal\par MRI orbits, neck, brain- showed inflammation cerebral basilar artery\par CT sinuses- pansinusitis, eustachian tube dysfunction\par Denies COVID vaccine- plans to receive it when antibody titers decrease\par Sees Dr. Naina Mansfield psychiatry [de-identified] : Initial Visit

## 2021-06-17 NOTE — END OF VISIT
[FreeTextEntry3] : Case seen and reviewed with ALLEY Crane. Agree with plan as outlined above.\par \par Patient with isolated mild elevation in APLA. Discussed with patient that this is not clinically significant and he can follow up with PCP.

## 2021-06-21 ENCOUNTER — APPOINTMENT (OUTPATIENT)
Dept: CARDIOLOGY | Facility: CLINIC | Age: 26
End: 2021-06-21
Payer: COMMERCIAL

## 2021-06-21 ENCOUNTER — NON-APPOINTMENT (OUTPATIENT)
Age: 26
End: 2021-06-21

## 2021-06-21 VITALS
OXYGEN SATURATION: 97 % | SYSTOLIC BLOOD PRESSURE: 137 MMHG | WEIGHT: 250 LBS | HEIGHT: 78 IN | HEART RATE: 75 BPM | DIASTOLIC BLOOD PRESSURE: 86 MMHG | BODY MASS INDEX: 28.93 KG/M2

## 2021-06-21 PROCEDURE — 99214 OFFICE O/P EST MOD 30 MIN: CPT

## 2021-06-21 PROCEDURE — 93000 ELECTROCARDIOGRAM COMPLETE: CPT

## 2021-07-03 NOTE — PHYSICAL EXAM
[General Appearance - Well Developed] : well developed [Normal Appearance] : normal appearance [Well Groomed] : well groomed [General Appearance - Well Nourished] : well nourished [No Deformities] : no deformities [General Appearance - In No Acute Distress] : no acute distress [Normal Conjunctiva] : the conjunctiva exhibited no abnormalities [Eyelids - No Xanthelasma] : the eyelids demonstrated no xanthelasmas [Normal Oral Mucosa] : normal oral mucosa [No Oral Pallor] : no oral pallor [No Oral Cyanosis] : no oral cyanosis [Normal Jugular Venous A Waves Present] : normal jugular venous A waves present [Normal Jugular Venous V Waves Present] : normal jugular venous V waves present [No Jugular Venous Brown A Waves] : no jugular venous brown A waves [Respiration, Rhythm And Depth] : normal respiratory rhythm and effort [Exaggerated Use Of Accessory Muscles For Inspiration] : no accessory muscle use [Auscultation Breath Sounds / Voice Sounds] : lungs were clear to auscultation bilaterally [Heart Rate And Rhythm] : heart rate and rhythm were normal [Heart Sounds] : normal S1 and S2 [Murmurs] : no murmurs present [Abdomen Soft] : soft [Abdomen Tenderness] : non-tender [Abdomen Mass (___ Cm)] : no abdominal mass palpated [Abnormal Walk] : normal gait [Gait - Sufficient For Exercise Testing] : the gait was sufficient for exercise testing [Nail Clubbing] : no clubbing of the fingernails [Cyanosis, Localized] : no localized cyanosis [Petechial Hemorrhages (___cm)] : no petechial hemorrhages [] : no rash [Skin Color & Pigmentation] : normal skin color and pigmentation [No Venous Stasis] : no venous stasis [Skin Lesions] : no skin lesions [No Skin Ulcers] : no skin ulcer [No Xanthoma] : no  xanthoma was observed [Oriented To Time, Place, And Person] : oriented to person, place, and time [Affect] : the affect was normal [Mood] : the mood was normal [No Anxiety] : not feeling anxious

## 2021-07-03 NOTE — DISCUSSION/SUMMARY
[FreeTextEntry1] : Syncope-Assoc with SOB in setting of covid.  No further sx\par \par Followup in 6 mths\par \par Time spent reviewing history, other MD notes, exam, pt discussion and note writing

## 2021-07-12 ENCOUNTER — APPOINTMENT (OUTPATIENT)
Dept: CARDIOLOGY | Facility: CLINIC | Age: 26
End: 2021-07-12

## 2021-07-22 ENCOUNTER — APPOINTMENT (OUTPATIENT)
Dept: OTOLARYNGOLOGY | Facility: CLINIC | Age: 26
End: 2021-07-22

## 2021-08-03 ENCOUNTER — APPOINTMENT (OUTPATIENT)
Dept: OTOLARYNGOLOGY | Facility: CLINIC | Age: 26
End: 2021-08-03
Payer: COMMERCIAL

## 2021-08-03 VITALS
OXYGEN SATURATION: 98 % | SYSTOLIC BLOOD PRESSURE: 124 MMHG | DIASTOLIC BLOOD PRESSURE: 81 MMHG | TEMPERATURE: 98 F | HEART RATE: 61 BPM

## 2021-08-03 DIAGNOSIS — J34.3 HYPERTROPHY OF NASAL TURBINATES: ICD-10-CM

## 2021-08-03 DIAGNOSIS — H69.81 OTHER SPECIFIED DISORDERS OF EUSTACHIAN TUBE, RIGHT EAR: ICD-10-CM

## 2021-08-03 PROCEDURE — 99213 OFFICE O/P EST LOW 20 MIN: CPT

## 2021-08-04 ENCOUNTER — LABORATORY RESULT (OUTPATIENT)
Age: 26
End: 2021-08-04

## 2021-08-04 ENCOUNTER — APPOINTMENT (OUTPATIENT)
Dept: NEUROLOGY | Facility: CLINIC | Age: 26
End: 2021-08-04
Payer: COMMERCIAL

## 2021-08-04 VITALS
HEART RATE: 71 BPM | WEIGHT: 255 LBS | BODY MASS INDEX: 29.5 KG/M2 | HEIGHT: 78 IN | DIASTOLIC BLOOD PRESSURE: 88 MMHG | SYSTOLIC BLOOD PRESSURE: 147 MMHG

## 2021-08-04 PROBLEM — H69.81 ETD (EUSTACHIAN TUBE DYSFUNCTION), RIGHT: Status: ACTIVE | Noted: 2021-02-24

## 2021-08-04 PROBLEM — J34.3 HYPERTROPHY OF BOTH INFERIOR NASAL TURBINATES: Status: ACTIVE | Noted: 2021-08-04

## 2021-08-04 PROCEDURE — 99213 OFFICE O/P EST LOW 20 MIN: CPT

## 2021-08-04 NOTE — HISTORY OF PRESENT ILLNESS
[FreeTextEntry1] : Mr. Shawn Maguire return to the office having been last seen on May 4, 2021.  He is a 26 year right-handed patient who suffered a bout of COVID-19 in January 2021.  When last seen, he was experiencing persistent but fluctuating symptoms which included right ear popping, tinnitus, brain fog, episodic limb tingling, palpitations and right retro-orbital discomfort.  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  He had a low titer IgM cardiolipin antibody.  He was treated with aspirin.\par \par He is still experiencing prominent right ear clicking and popping and left ear pressure.  He underwent a procedure on his sinuses and anticipates a balloon procedure on his eustachian tube or tubes.\par \par His major complaint is vertical diplopia on left lateral gaze.  He was evaluated by Dr. Lux Eduardo, Dr. Robert Almaguer and Dr. Isacc Mcnamara.  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  His headaches have improved.\par \par He received his first Pfizer COVID-19 vaccine 2 weeks ago and developed a recurrence of right facial twitching, brain fog and mild headache.\par \par He was evaluated by hematology for a low titer IgM cardiolipin antibody.  This was felt to be of no consequence and aspirin was continued.

## 2021-08-04 NOTE — PHYSICAL EXAM
[FreeTextEntry1] : Constitutional:  Patient was well-developed, well-nourished and in no acute distress. \par \par Head:  Normocephalic, atraumatic. Tympanic membranes were clear. \par \par Neck:  Supple with full range of motion. \par \par Cardiovascular:  Cardiac rhythm was regular without murmur. There were no carotid bruits. Peripheral pulses were full and symmetric. \par \par Respiratory:  Lungs were clear. \par \par Abdomen:  Soft and nontender. \par \par Spine:  Nontender. \par \par Skin:  There were no rashes. \par \par NEUROLOGICAL EXAMINATION:\par \par Mental Status: Patient was alert and oriented. Speech was fluent. There was no dysarthria. \par \par Cranial Nerves: \par \par II: Visual acuity was 20/2 in the right eye and 20/20 in left eye with near card. Pupils were equal and reactive. Visual fields were full. Funduscopic examination was normal. \par \par III, IV, VI:  Eye movements were full without nystagmus when examining each eye individually.  When examining conjugate eye movements, all was normal except on left gaze when the right eye deviated upward.  He had a left head tilt.\par \par V: Facial sensation was intact. \par \par VII: Facial strength was normal. \par \par VIII: Hearing was equal. \par \par IX, X: Palatal movement was normal. Phonation was normal. \par \par XI: Sternocleidomastoids and trapezii were normal. \par \par XII: Tongue was midline and movements normal. There was no lingual atrophy or fasciculations. \par \par Motor Examination: Muscle bulk, tone and strength were normal. \par \par Sensory Examination: Pinprick, vibration and joint position sense were intact. \par \par Reflexes: DTRs were 2 throughout. \par \par Plantar Responses: Plantar responses were flexor. \par \par Coordination/Cerebellar Function: There was no dysmetria on finger to nose or heel to shin testing. \par \par Gait/Stance: Gait and tandem were normal. Romberg was negative.\par

## 2021-08-04 NOTE — PROCEDURE
[Congested] : congested [Pamela] : pamela [Red] : red [de-identified] : Right side Eustachium Tube  Dysfunction.  Rhinitis.

## 2021-08-04 NOTE — CONSULT LETTER
[Dear  ___] : Dear  [unfilled], [Consult Letter:] : I had the pleasure of evaluating your patient, [unfilled]. [Please see my note below.] : Please see my note below. [Consult Closing:] : Thank you very much for allowing me to participate in the care of this patient.  If you have any questions, please do not hesitate to contact me. [Sincerely,] : Sincerely, [FreeTextEntry3] : Jerry Anthony MD\par  [DrStevan  ___] : Dr. KWAN [DrStevan ___] : Dr. KWAN [___] : [unfilled]

## 2021-08-04 NOTE — REASON FOR VISIT
[Subsequent Evaluation] : a subsequent evaluation for [FreeTextEntry2] : Persistent ear pressure nasal congestion for the past couple of months. Right side   Eustachium Tube  Dysfunction.  Rhinitis.

## 2021-08-04 NOTE — ASSESSMENT
[FreeTextEntry1] : Mr. Maguire is a 26-year-old who suffered a bout of COVID-19 in January 2021.  He has persistent otic symptoms has been followed by ENT.  He is being most troubled by vertical diplopia on left gaze which had its onset following his COVID-19 infection in January 2021.  I will discuss with Dr. Eduardo the localization of his ocular motility issues.  I am uncertain whether it is supra or infranuclear.\par \par I will order additional blood tests including a myasthenia panel, cytokines and repeat cardiolipin antibodies.  He will return to the office for repetitive stimulation studies.  Further management will depend upon these results and his clinical course.

## 2021-08-04 NOTE — HISTORY OF PRESENT ILLNESS
[de-identified] : 26 years old male patient with history of Persistent ear pressure nasal congestion for the past couple of months.   Patient is present today in the office with Persistent right side  Eustachium Tube  Dysfunction.  Rhinitis.  Patient with history of COVID-19 in 02/2021.

## 2021-08-05 ENCOUNTER — NON-APPOINTMENT (OUTPATIENT)
Age: 26
End: 2021-08-05

## 2021-08-06 ENCOUNTER — APPOINTMENT (OUTPATIENT)
Dept: OPHTHALMOLOGY | Facility: CLINIC | Age: 26
End: 2021-08-06
Payer: COMMERCIAL

## 2021-08-06 ENCOUNTER — NON-APPOINTMENT (OUTPATIENT)
Age: 26
End: 2021-08-06

## 2021-08-06 PROCEDURE — 92012 INTRM OPH EXAM EST PATIENT: CPT

## 2021-08-09 NOTE — HISTORY OF PRESENT ILLNESS
[de-identified] : 26 years old male patient with history of Persistent ear pressure nasal congestion for the past couple of months.   Patient is present today in the office with Persistent right side  Eustachium Tube  Dysfunction.  Rhinitis.  Patient with history of COVID-19 in 02/2021.

## 2021-08-09 NOTE — HISTORY OF PRESENT ILLNESS
[de-identified] : 26 years old male patient with history of Persistent ear pressure nasal congestion for the past couple of months.   Patient is present today in the office with Persistent right side  Eustachium Tube  Dysfunction.  Rhinitis.  Patient with history of COVID-19 in 02/2021.

## 2021-08-09 NOTE — HISTORY OF PRESENT ILLNESS
[de-identified] : 26 years old male patient with history of Persistent ear pressure nasal congestion for the past couple of months.   Patient is present today in the office with Persistent right side  Eustachium Tube  Dysfunction.  Rhinitis.  Patient with history of COVID-19 in 02/2021.

## 2021-08-20 ENCOUNTER — APPOINTMENT (OUTPATIENT)
Dept: OPHTHALMOLOGY | Facility: CLINIC | Age: 26
End: 2021-08-20
Payer: COMMERCIAL

## 2021-08-20 ENCOUNTER — NON-APPOINTMENT (OUTPATIENT)
Age: 26
End: 2021-08-20

## 2021-08-20 PROCEDURE — 92012 INTRM OPH EXAM EST PATIENT: CPT

## 2021-08-20 PROCEDURE — 92060 SENSORIMOTOR EXAMINATION: CPT

## 2021-09-02 DIAGNOSIS — F48.8 OTHER SPECIFIED NONPSYCHOTIC MENTAL DISORDERS: ICD-10-CM

## 2021-09-07 ENCOUNTER — NON-APPOINTMENT (OUTPATIENT)
Age: 26
End: 2021-09-07

## 2021-09-07 LAB
BASOPHILS # BLD AUTO: 0.06 K/UL
BASOPHILS NFR BLD AUTO: 0.8 %
EOSINOPHIL # BLD AUTO: 0.42 K/UL
EOSINOPHIL NFR BLD AUTO: 5.7 %
HCT VFR BLD CALC: 47.9 %
HGB BLD-MCNC: 16.4 G/DL
IMM GRANULOCYTES NFR BLD AUTO: 0.3 %
LYMPHOCYTES # BLD AUTO: 2.03 K/UL
LYMPHOCYTES NFR BLD AUTO: 27.5 %
MAN DIFF?: NORMAL
MCHC RBC-ENTMCNC: 30.7 PG
MCHC RBC-ENTMCNC: 34.2 GM/DL
MCV RBC AUTO: 89.5 FL
MONOCYTES # BLD AUTO: 0.63 K/UL
MONOCYTES NFR BLD AUTO: 8.5 %
NEUTROPHILS # BLD AUTO: 4.21 K/UL
NEUTROPHILS NFR BLD AUTO: 57.2 %
PLATELET # BLD AUTO: 248 K/UL
RBC # BLD: 5.35 M/UL
RBC # FLD: 12 %
WBC # FLD AUTO: 7.37 K/UL

## 2021-09-08 LAB
ALBUMIN SERPL ELPH-MCNC: 4.8 G/DL
ALP BLD-CCNC: 111 U/L
ALT SERPL-CCNC: 34 U/L
ANION GAP SERPL CALC-SCNC: 11 MMOL/L
AST SERPL-CCNC: 24 U/L
BILIRUB SERPL-MCNC: 0.6 MG/DL
BUN SERPL-MCNC: 14 MG/DL
CALCIUM SERPL-MCNC: 9.8 MG/DL
CHLORIDE SERPL-SCNC: 101 MMOL/L
CO2 SERPL-SCNC: 27 MMOL/L
CREAT SERPL-MCNC: 0.95 MG/DL
CRP SERPL-MCNC: 4 MG/L
ERYTHROCYTE [SEDIMENTATION RATE] IN BLOOD BY WESTERGREN METHOD: 4 MM/HR
GLUCOSE SERPL-MCNC: 95 MG/DL
POTASSIUM SERPL-SCNC: 4.6 MMOL/L
PROT SERPL-MCNC: 7.5 G/DL
SODIUM SERPL-SCNC: 139 MMOL/L

## 2021-09-13 ENCOUNTER — APPOINTMENT (OUTPATIENT)
Dept: DISASTER EMERGENCY | Facility: CLINIC | Age: 26
End: 2021-09-13

## 2021-09-14 LAB
A-TUMOR NECROSIS FACT SERPL-MCNC: 24.6 PG/ML
ACHR BLOCK AB SER QL: 17 %
CARDIOLIPIN IGM SER-MCNC: 28.1 MPL
CARDIOLIPIN IGM SER-MCNC: <5 GPL
DEPRECATED CARDIOLIPIN IGA SER: <5 APL
IGNF SERPL-MCNC: <4.2 PG/ML
IL10 SERPL-MCNC: 3.6 PG/ML
IL12 SERPL-MCNC: <1.9 PG/ML
IL13 SERPL-MCNC: <1.7 PG/ML
IL17A SERPL-MCNC: <1.4 PG/ML
IL2 SERPL-MCNC: 832.9 PG/ML
IL2 SERPL-MCNC: <2.1 PG/ML
IL4 SERPL-MCNC: <2.2 PG/ML
IL6 SERPL-MCNC: <2 PG/ML
IL8 SERPL-MCNC: <3 PG/ML
INTERLEUKIN 1 BETA: <6.5 PG/ML
INTERLEUKIN 5: <2.1 PG/ML
SARS-COV-2 N GENE NPH QL NAA+PROBE: NOT DETECTED

## 2021-09-15 ENCOUNTER — APPOINTMENT (OUTPATIENT)
Dept: OTOLARYNGOLOGY | Facility: CLINIC | Age: 26
End: 2021-09-15

## 2021-09-20 ENCOUNTER — APPOINTMENT (OUTPATIENT)
Dept: NEUROLOGY | Facility: CLINIC | Age: 26
End: 2021-09-20
Payer: COMMERCIAL

## 2021-09-20 VITALS
SYSTOLIC BLOOD PRESSURE: 132 MMHG | WEIGHT: 260 LBS | HEIGHT: 78 IN | DIASTOLIC BLOOD PRESSURE: 86 MMHG | HEART RATE: 54 BPM | BODY MASS INDEX: 30.08 KG/M2

## 2021-09-20 DIAGNOSIS — R29.898 OTHER SYMPTOMS AND SIGNS INVOLVING THE MUSCULOSKELETAL SYSTEM: ICD-10-CM

## 2021-09-20 PROCEDURE — 99213 OFFICE O/P EST LOW 20 MIN: CPT

## 2021-09-20 NOTE — HISTORY OF PRESENT ILLNESS
[FreeTextEntry1] : Mr. Shawn Maguire returned to the office having been last seen on August 4, 2021.  He is a 26 year right-handed patient who suffered a bout of COVID-19 in January 2021.  He was experiencing persistent but fluctuating symptoms which included right ear popping, tinnitus, brain fog, episodic limb tingling, palpitations, vertical diplopia on left lateral gaze and right retro-orbital discomfort.  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  He had a low titer IgM cardiolipin antibody.  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 He also complained of occasional floaters and blurred vision. \par \par Following his first Pfizer COVID-19 vaccine in late July, he developed a recurrence of right facial twitching, brain fog and mild headache.  As a consequence, he did not receive his second vaccine.\par \par He complains of diffuse limb weakness and occasional twitching of his dorsal arms.  His hands and legs on occasion go numb.  He has only mild neck discomfort.  His brain fog has worsened.  He still complains of small white floaters and double vision.\par \par Recent blood tests were notable for the following unremarkable studies: CMP, CBC, cardiolipin IgG and IgA.  Sedimentation rate was 4 and C-reactive protein was 4.  Cytokine panel was notable for elevated tumor necrosis factor alpha and interleukin-10.  Acetylcholine blocking antibodies were negative.  He remains on aspirin for positive IgM cardiolipin antibody.\par \par

## 2021-09-20 NOTE — CONSULT LETTER
[Dear  ___] : Dear  [unfilled], [Please see my note below.] : Please see my note below. [Consult Letter:] : I had the pleasure of evaluating your patient, [unfilled]. [Consult Closing:] : Thank you very much for allowing me to participate in the care of this patient.  If you have any questions, please do not hesitate to contact me. [Sincerely,] : Sincerely, [FreeTextEntry3] : Jerry Anthony MD\par  [DrStevan  ___] : Dr. KWAN [DrStevan ___] : Dr. KWAN [___] : [unfilled]

## 2021-09-20 NOTE — ASSESSMENT
[FreeTextEntry1] : Mr. Maguire is a 26-year-old who suffered a bout of COVID-19 in January 2021.  He has a myriad of somatic symptoms since his illness.  These include a right 4th nerve palsy, brain fog, headaches, limb weakness, intermittent numbness and fasciculations.\par \par He remains on aspirin for a low titer IgM cardiolipin antibody.  Cytokine panel revealed continued elevated parameters.\par \par I suggested that he undergo an MRI of the cervical spine without contrast to exclude myelitis.  He is scheduled for EMG and nerve conduction studies in October.  Given his myriad of symptoms, I might consider CSF analysis.  Further management will depend upon these results and his clinical course.

## 2021-09-20 NOTE — PHYSICAL EXAM
[FreeTextEntry1] : Constitutional:  Patient was well-developed, well-nourished and in no acute distress. \par \par Head:  Normocephalic, atraumatic. Tympanic membranes were clear. \par \par Neck:  Supple with full range of motion. \par \par Cardiovascular:  Cardiac rhythm was regular without murmur. There were no carotid bruits. Peripheral pulses were full and symmetric. \par \par Respiratory:  Lungs were clear. \par \par Abdomen:  Soft and nontender. \par \par Spine:  Nontender. \par \par Skin:  There were no rashes. \par \par NEUROLOGICAL EXAMINATION:\par \par Mental Status: Patient was alert and oriented. Speech was fluent. There was no dysarthria. \par \par Cranial Nerves: \par \par II: Visual acuity was 20/20 in the right eye and 20/20 in left eye with near card. Pupils were equal and reactive. Visual fields were full. Funduscopic examination was normal. \par \par III, IV, VI:  Eye movements were full without nystagmus when examining each eye individually.  When examining conjugate eye movements, all was normal except on left gaze when the right eye deviated upward.  He had a left head tilt.\par \par V: Facial sensation was intact. \par \par VII: Facial strength was normal. \par \par VIII: Hearing was equal. \par \par IX, X: Palatal movement was normal. Phonation was normal. \par \par XI: Sternocleidomastoids and trapezii were normal. \par \par XII: Tongue was midline and movements normal. There was no lingual atrophy or fasciculations. \par \par Motor Examination: Muscle bulk, tone and strength were normal.  There were no fasciculations.\par \par Sensory Examination: Pinprick, vibration and joint position sense were intact. \par \par Reflexes: DTRs were 2 throughout. \par \par Plantar Responses: Plantar responses were flexor. \par \par Coordination/Cerebellar Function: There was no dysmetria on finger to nose or heel to shin testing. \par \par Gait/Stance: Gait and tandem were normal. Romberg was negative.\par

## 2021-10-13 ENCOUNTER — APPOINTMENT (OUTPATIENT)
Dept: MRI IMAGING | Facility: CLINIC | Age: 26
End: 2021-10-13
Payer: COMMERCIAL

## 2021-10-13 ENCOUNTER — OUTPATIENT (OUTPATIENT)
Dept: OUTPATIENT SERVICES | Facility: HOSPITAL | Age: 26
LOS: 1 days | End: 2021-10-13
Payer: COMMERCIAL

## 2021-10-13 DIAGNOSIS — Z00.8 ENCOUNTER FOR OTHER GENERAL EXAMINATION: ICD-10-CM

## 2021-10-13 DIAGNOSIS — R29.898 OTHER SYMPTOMS AND SIGNS INVOLVING THE MUSCULOSKELETAL SYSTEM: ICD-10-CM

## 2021-10-13 PROCEDURE — 72156 MRI NECK SPINE W/O & W/DYE: CPT

## 2021-10-13 PROCEDURE — 72156 MRI NECK SPINE W/O & W/DYE: CPT | Mod: 26

## 2021-10-13 PROCEDURE — A9585: CPT

## 2021-10-18 ENCOUNTER — NON-APPOINTMENT (OUTPATIENT)
Age: 26
End: 2021-10-18

## 2021-10-18 ENCOUNTER — APPOINTMENT (OUTPATIENT)
Dept: INTERNAL MEDICINE | Facility: CLINIC | Age: 26
End: 2021-10-18
Payer: COMMERCIAL

## 2021-10-18 VITALS — BODY MASS INDEX: 31.24 KG/M2 | WEIGHT: 270 LBS | HEIGHT: 78 IN

## 2021-10-18 VITALS
RESPIRATION RATE: 12 BRPM | DIASTOLIC BLOOD PRESSURE: 72 MMHG | HEART RATE: 64 BPM | TEMPERATURE: 98 F | SYSTOLIC BLOOD PRESSURE: 118 MMHG | OXYGEN SATURATION: 99 %

## 2021-10-18 DIAGNOSIS — R76.0 RAISED ANTIBODY TITER: ICD-10-CM

## 2021-10-18 DIAGNOSIS — H69.83 OTHER SPECIFIED DISORDERS OF EUSTACHIAN TUBE, BILATERAL: ICD-10-CM

## 2021-10-18 DIAGNOSIS — Z01.818 ENCOUNTER FOR OTHER PREPROCEDURAL EXAMINATION: ICD-10-CM

## 2021-10-18 PROCEDURE — 99212 OFFICE O/P EST SF 10 MIN: CPT | Mod: 25

## 2021-10-18 PROCEDURE — 99202 OFFICE O/P NEW SF 15 MIN: CPT | Mod: 25

## 2021-10-18 PROCEDURE — 93000 ELECTROCARDIOGRAM COMPLETE: CPT

## 2021-10-18 RX ORDER — METOPROLOL SUCCINATE 25 MG/1
25 TABLET, EXTENDED RELEASE ORAL
Qty: 90 | Refills: 3 | Status: COMPLETED | COMMUNITY
Start: 2021-06-21 | End: 2021-10-18

## 2021-10-18 RX ORDER — ASPIRIN 325 MG/1
TABLET, FILM COATED ORAL
Refills: 0 | Status: COMPLETED | COMMUNITY
End: 2021-10-18

## 2021-10-18 NOTE — PHYSICAL EXAM
[Normal Sclera/Conjunctiva] : normal sclera/conjunctiva [PERRL] : pupils equal round and reactive to light [EOMI] : extraocular movements intact [Normal Oropharynx] : the oropharynx was normal [No JVD] : no jugular venous distention [No Carotid Bruits] : no carotid bruits [No Edema] : there was no peripheral edema [Soft] : abdomen soft [Non Tender] : non-tender [Coordination Grossly Intact] : coordination grossly intact [No Focal Deficits] : no focal deficits [Normal] : affect was normal and insight and judgment were intact

## 2021-10-18 NOTE — RESULTS/DATA
[] : results reviewed [de-identified] : pending [de-identified] : pending [de-identified] : NSR 63 bpm, no ST-T changes [de-identified] : pending [de-identified] : 2-D ECHO 4/7/2021: normal LVSF, normal RV, no diastolic dysfunctin, normal valves

## 2021-10-18 NOTE — CONSULT LETTER
[Dear  ___] : Dear  [unfilled], [( Thank you for referring [unfilled] for consultation for _____ )] : Thank you for referring [unfilled] for consultation for [unfilled] [Please see my note below.] : Please see my note below. [Sincerely,] : Sincerely, [FreeTextEntry3] : Dr. Guerline Howell

## 2021-10-18 NOTE — ASSESSMENT
[Patient Optimized for Surgery] : Patient optimized for surgery [No Further Testing Recommended] : no further testing recommended [FreeTextEntry6] : as above [FreeTextEntry4] : 27 y/o M with history as noted, had COVID19 infection in 2/2021 with residual neurological symptoms. Currently he is functionally improved, no limitations from cardiovascular standpoint. He is medically cleared for surgery, pending review of labs.\par He should continue Aspirin 81 mg perioperatively given h/o Anticardiolipin IgM, hold other vitamins/supplements from 7 days prior to surgery.  [FreeTextEntry7] : as above

## 2021-10-18 NOTE — HISTORY OF PRESENT ILLNESS
[No Pertinent Cardiac History] : no history of aortic stenosis, atrial fibrillation, coronary artery disease, recent myocardial infarction, or implantable device/pacemaker [Asthma] : asthma [No Adverse Anesthesia Reaction] : no adverse anesthesia reaction in self or family member [Chronic Anticoagulation] : no chronic anticoagulation [Chronic Kidney Disease] : no chronic kidney disease [Diabetes] : no diabetes [(Patient denies any chest pain, claudication, dyspnea on exertion, orthopnea, palpitations or syncope)] : Patient denies any chest pain, claudication, dyspnea on exertion, orthopnea, palpitations or syncope [Moderate (4-6 METs)] : Moderate (4-6 METs) [FreeTextEntry1] : Eustachian Tube/Nasal Surgery [FreeTextEntry2] : 10/28/21 [FreeTextEntry3] : Dr.Yousef Villeda [FreeTextEntry4] : ANTONETTE FLORES is a 26 year old male presents for medical clearance for proposed surgery; has persistent "popping" sensation/pressure in R>L ears, planned for surgery as noted. He is also s/p recent nasal sinuplasty in 4/2021 without improved symptoms. Had COVID19 infection in 2/2021, has had residual headaches, intermittent arm twitching/weakness since then, following with neurologist. No chest pain, shortness of breath. Currently swims/does aerobics ~30 min/day, able to climb up 2-3 flights stairs without issue. H/o +Cardiolipin Abs, on Aspirin as recommended by hematologist and neurologist. Never started Metoprolol (for palpitations per patient, have resolved).

## 2021-10-19 ENCOUNTER — TRANSCRIPTION ENCOUNTER (OUTPATIENT)
Age: 26
End: 2021-10-19

## 2021-10-19 LAB
ALBUMIN SERPL ELPH-MCNC: 4.3 G/DL
ALP BLD-CCNC: 108 U/L
ALT SERPL-CCNC: 28 U/L
ANION GAP SERPL CALC-SCNC: 11 MMOL/L
APTT BLD: 34 SEC
AST SERPL-CCNC: 18 U/L
BASOPHILS # BLD AUTO: 0.05 K/UL
BASOPHILS NFR BLD AUTO: 0.8 %
BILIRUB SERPL-MCNC: 0.4 MG/DL
BUN SERPL-MCNC: 22 MG/DL
CALCIUM SERPL-MCNC: 9.3 MG/DL
CHLORIDE SERPL-SCNC: 108 MMOL/L
CO2 SERPL-SCNC: 24 MMOL/L
CREAT SERPL-MCNC: 1.26 MG/DL
EOSINOPHIL # BLD AUTO: 0.39 K/UL
EOSINOPHIL NFR BLD AUTO: 6.1 %
GLUCOSE SERPL-MCNC: 86 MG/DL
HCT VFR BLD CALC: 48 %
HGB BLD-MCNC: 16 G/DL
IMM GRANULOCYTES NFR BLD AUTO: 0.5 %
INR PPP: 1.02 RATIO
LYMPHOCYTES # BLD AUTO: 1.93 K/UL
LYMPHOCYTES NFR BLD AUTO: 30.1 %
MAN DIFF?: NORMAL
MCHC RBC-ENTMCNC: 30.7 PG
MCHC RBC-ENTMCNC: 33.3 GM/DL
MCV RBC AUTO: 92 FL
MONOCYTES # BLD AUTO: 0.64 K/UL
MONOCYTES NFR BLD AUTO: 10 %
NEUTROPHILS # BLD AUTO: 3.38 K/UL
NEUTROPHILS NFR BLD AUTO: 52.5 %
PLATELET # BLD AUTO: 250 K/UL
POTASSIUM SERPL-SCNC: 4.6 MMOL/L
PROT SERPL-MCNC: 7 G/DL
PT BLD: 12 SEC
RBC # BLD: 5.22 M/UL
RBC # FLD: 12 %
SODIUM SERPL-SCNC: 142 MMOL/L
WBC # FLD AUTO: 6.42 K/UL

## 2021-10-20 ENCOUNTER — TRANSCRIPTION ENCOUNTER (OUTPATIENT)
Age: 26
End: 2021-10-20

## 2021-10-21 ENCOUNTER — NON-APPOINTMENT (OUTPATIENT)
Age: 26
End: 2021-10-21

## 2021-10-23 DIAGNOSIS — Z01.818 ENCOUNTER FOR OTHER PREPROCEDURAL EXAMINATION: ICD-10-CM

## 2021-10-25 ENCOUNTER — APPOINTMENT (OUTPATIENT)
Dept: DISASTER EMERGENCY | Facility: CLINIC | Age: 26
End: 2021-10-25

## 2021-10-26 ENCOUNTER — TRANSCRIPTION ENCOUNTER (OUTPATIENT)
Age: 26
End: 2021-10-26

## 2021-10-26 LAB — SARS-COV-2 N GENE NPH QL NAA+PROBE: NOT DETECTED

## 2021-10-27 ENCOUNTER — TRANSCRIPTION ENCOUNTER (OUTPATIENT)
Age: 26
End: 2021-10-27

## 2021-10-28 ENCOUNTER — APPOINTMENT (OUTPATIENT)
Dept: OTOLARYNGOLOGY | Facility: AMBULATORY SURGERY CENTER | Age: 26
End: 2021-10-28

## 2021-10-28 ENCOUNTER — OUTPATIENT (OUTPATIENT)
Dept: OUTPATIENT SERVICES | Facility: HOSPITAL | Age: 26
LOS: 1 days | Discharge: HOME | End: 2021-10-28
Payer: COMMERCIAL

## 2021-10-28 PROCEDURE — 42831 REMOVAL OF ADENOIDS: CPT | Mod: 52

## 2021-10-28 PROCEDURE — 42808 EXCISE PHARYNX LESION: CPT

## 2021-10-28 PROCEDURE — 30117 REMOVAL OF INTRANASAL LESION: CPT

## 2021-10-28 PROCEDURE — 30802 ABLATE INF TURBINATE SUBMUC: CPT

## 2021-10-29 ENCOUNTER — APPOINTMENT (OUTPATIENT)
Dept: NEUROLOGY | Facility: CLINIC | Age: 26
End: 2021-10-29

## 2021-11-12 ENCOUNTER — TRANSCRIPTION ENCOUNTER (OUTPATIENT)
Age: 26
End: 2021-11-12

## 2021-12-02 ENCOUNTER — APPOINTMENT (OUTPATIENT)
Dept: NEUROLOGY | Facility: CLINIC | Age: 26
End: 2021-12-02
Payer: COMMERCIAL

## 2021-12-02 VITALS — HEART RATE: 96 BPM | HEIGHT: 78 IN | DIASTOLIC BLOOD PRESSURE: 90 MMHG | SYSTOLIC BLOOD PRESSURE: 140 MMHG

## 2021-12-02 DIAGNOSIS — R25.3 FASCICULATION: ICD-10-CM

## 2021-12-02 DIAGNOSIS — R29.898 OTHER SYMPTOMS AND SIGNS INVOLVING THE MUSCULOSKELETAL SYSTEM: ICD-10-CM

## 2021-12-02 DIAGNOSIS — U07.1 COVID-19: ICD-10-CM

## 2021-12-02 DIAGNOSIS — R20.2 PARESTHESIA OF SKIN: ICD-10-CM

## 2021-12-02 PROCEDURE — 95886 MUSC TEST DONE W/N TEST COMP: CPT

## 2021-12-02 PROCEDURE — ZZZZZ: CPT

## 2021-12-02 PROCEDURE — 95909 NRV CNDJ TST 5-6 STUDIES: CPT

## 2021-12-02 PROCEDURE — 99213 OFFICE O/P EST LOW 20 MIN: CPT

## 2021-12-02 NOTE — PROCEDURE
[FreeTextEntry1] : Nerve conduction studies and electromyography [FreeTextEntry2] : Sensor and motor symptoms following infection with COVID-19 [FreeTextEntry3] : Electrodiagnostic testing was performed in the right upper and lower extremities.\par \par The radial and median sensory potentials and conduction velocities were normal.\par \par The median motor distal latency, compound muscle action potential conduction velocity were normal.\par \par The median F wave was normal.\par \par The sural sensory potential and conduction velocity were normal.\par \par The tibial motor distal latency, compound muscle action potential and conduction velocity were normal.\par \par The tibial F wave was normal.  The tibial H reflex was normal.\par \par Needle electromyography was performed in several right lower extremity and lumbar paraspinal muscles.  There was no spontaneous activity noted in any muscle tested.  Motor units and recruitment patterns were normal.\par \par Conclusions: This was a normal study.\par \par There was no electrophysiologic evidence of right lumbar radiculopathy, median neuropathy, sensorimotor polyneuropathy, motor neuronopathy or myopathy.

## 2021-12-02 NOTE — HISTORY OF PRESENT ILLNESS
[FreeTextEntry1] : Mr. Shawn Maguire returned to the office having been last seen on 09/28/2021.  He is a 26 year right-handed patient who suffered a bout of COVID-19 in January 2021.  He was experiencing persistent but fluctuating symptoms which included right ear popping, tinnitus, brain fog, episodic limb tingling, palpitations, vertical diplopia on left lateral gaze and right retro-orbital discomfort.  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  He had a low titer IgM cardiolipin antibody.  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 He also complained of occasional floaters and blurred vision. \par \par Following his first Pfizer COVID-19 vaccine in late July, he developed a recurrence of right facial twitching, brain fog and mild headache.  As a consequence, he did not receive his second vaccine.\par \par He is feeling better since last seen.  He believes this is because time has passed since his last COVID-19 vaccine. He complains of occasional limb weakness which is associated with anxiety.  He has occasional tingling in his hands and feet and occasional right facial twitching.  His diplopia is improved with use of prisms.  Since last seen he underwent dilatation of his eustachian tubes without much improvement in his ear complaints.\par \par MRI of the cervical spine performed on 10/13/2021 revealed slight diminution of the vertebral bodies and intervertebral discs at these C4-5 level likely on a congenital basis.  There was minimal cervical spondylosis.  There was sinonasal mucosal disease.

## 2021-12-02 NOTE — ASSESSMENT
[FreeTextEntry1] : Mr. Maguire is a 26-year-old who suffered a bout of COVID-19 in January 2021.  He has somatic complaints including weakness, paresthesias and fasciculations have improved.  He still has difficulties due to a right 4th nerve palsy.  Electrodiagnostic testing performed in the office today did not reveal evidence of myopathy, sensorimotor polyneuropathy or motor neuronopathy. \par \par He remains on aspirin for a low titer IgM cardiolipin antibody.  He will follow up with Dr. Eduardo and Dr. Willard.\par \par CSF analysis is an option but probably with low yield.  He will give this suggestion consideration particularly if his symptoms do not continue to improve.  He will be reevaluated in April 2022.  Telephone contact will be maintained in the meantime.

## 2021-12-10 ENCOUNTER — NON-APPOINTMENT (OUTPATIENT)
Age: 26
End: 2021-12-10

## 2021-12-10 ENCOUNTER — APPOINTMENT (OUTPATIENT)
Dept: OPHTHALMOLOGY | Facility: CLINIC | Age: 26
End: 2021-12-10
Payer: COMMERCIAL

## 2021-12-10 PROCEDURE — 92012 INTRM OPH EXAM EST PATIENT: CPT

## 2021-12-27 ENCOUNTER — APPOINTMENT (OUTPATIENT)
Dept: OTOLARYNGOLOGY | Facility: CLINIC | Age: 26
End: 2021-12-27
Payer: COMMERCIAL

## 2021-12-27 VITALS
WEIGHT: 260 LBS | RESPIRATION RATE: 16 BRPM | BODY MASS INDEX: 30.08 KG/M2 | OXYGEN SATURATION: 99 % | TEMPERATURE: 98.3 F | DIASTOLIC BLOOD PRESSURE: 89 MMHG | HEART RATE: 72 BPM | SYSTOLIC BLOOD PRESSURE: 129 MMHG | HEIGHT: 78 IN

## 2021-12-27 DIAGNOSIS — H66.90 OTITIS MEDIA, UNSPECIFIED, UNSPECIFIED EAR: ICD-10-CM

## 2021-12-27 PROCEDURE — 99213 OFFICE O/P EST LOW 20 MIN: CPT | Mod: 24

## 2021-12-28 ENCOUNTER — TRANSCRIPTION ENCOUNTER (OUTPATIENT)
Age: 26
End: 2021-12-28

## 2022-01-05 ENCOUNTER — TRANSCRIPTION ENCOUNTER (OUTPATIENT)
Age: 27
End: 2022-01-05

## 2022-01-06 ENCOUNTER — APPOINTMENT (OUTPATIENT)
Dept: INTERNAL MEDICINE | Facility: CLINIC | Age: 27
End: 2022-01-06
Payer: COMMERCIAL

## 2022-01-06 VITALS — WEIGHT: 266 LBS | BODY MASS INDEX: 30.78 KG/M2 | HEIGHT: 78 IN | TEMPERATURE: 94.5 F

## 2022-01-06 VITALS — RESPIRATION RATE: 16 BRPM | HEART RATE: 70 BPM | DIASTOLIC BLOOD PRESSURE: 85 MMHG | SYSTOLIC BLOOD PRESSURE: 125 MMHG

## 2022-01-06 DIAGNOSIS — N52.9 MALE ERECTILE DYSFUNCTION, UNSPECIFIED: ICD-10-CM

## 2022-01-06 PROCEDURE — 99213 OFFICE O/P EST LOW 20 MIN: CPT

## 2022-01-06 RX ORDER — ASPIRIN 81 MG/1
81 TABLET, CHEWABLE ORAL
Refills: 11 | Status: ACTIVE | COMMUNITY
Start: 2022-01-06

## 2022-01-06 RX ORDER — AMOXICILLIN 500 MG/1
500 CAPSULE ORAL
Qty: 14 | Refills: 0 | Status: DISCONTINUED | COMMUNITY
Start: 2021-10-27 | End: 2022-01-06

## 2022-01-06 RX ORDER — DOCUSATE SODIUM 100 MG/1
100 CAPSULE ORAL TWICE DAILY
Qty: 60 | Refills: 0 | Status: DISCONTINUED | COMMUNITY
Start: 2021-10-27 | End: 2022-01-06

## 2022-01-06 RX ORDER — ZINC SULFATE 50(220)MG
CAPSULE ORAL
Refills: 0 | Status: DISCONTINUED | COMMUNITY
End: 2022-01-06

## 2022-01-06 RX ORDER — AZITHROMYCIN 500 MG/1
500 TABLET, FILM COATED ORAL DAILY
Qty: 2 | Refills: 2 | Status: DISCONTINUED | COMMUNITY
Start: 2021-12-27 | End: 2022-01-06

## 2022-01-06 RX ORDER — AMOXICILLIN AND CLAVULANATE POTASSIUM 875; 125 MG/1; MG/1
875-125 TABLET, COATED ORAL
Qty: 14 | Refills: 0 | Status: DISCONTINUED | COMMUNITY
Start: 2021-12-29 | End: 2022-01-06

## 2022-01-06 RX ORDER — VITAMIN K2 90 MCG
125 MCG CAPSULE ORAL
Refills: 0 | Status: DISCONTINUED | COMMUNITY
Start: 2021-06-21 | End: 2022-01-06

## 2022-01-06 RX ORDER — ACETAMINOPHEN AND CODEINE 300; 30 MG/1; MG/1
300-30 TABLET ORAL
Qty: 30 | Refills: 0 | Status: DISCONTINUED | COMMUNITY
Start: 2021-10-27 | End: 2022-01-06

## 2022-01-06 RX ORDER — METHYLPREDNISOLONE 4 MG/1
4 TABLET ORAL
Qty: 1 | Refills: 0 | Status: DISCONTINUED | COMMUNITY
Start: 2021-10-27 | End: 2022-01-06

## 2022-01-06 NOTE — HISTORY OF PRESENT ILLNESS
[FreeTextEntry1] : Had Covid 1/21 - had some long symptoms\par Had Covid again 12/21 - with URI symptoms - home test x 2 positive\par Recent ear infection\par No cough - occasional SOB\par No fever\par ED since Covid - desire is still there.

## 2022-01-06 NOTE — PHYSICAL EXAM
[Normal Sclera/Conjunctiva] : normal sclera/conjunctiva [No Edema] : there was no peripheral edema [Testes Tenderness] : no tenderness of the testes [Testes Mass (___cm)] : there were no testicular masses [Normal] : no posterior cervical lymphadenopathy and no anterior cervical lymphadenopathy [Alert and Oriented x3] : oriented to person, place, and time [FreeTextEntry1] : Normal testes - size

## 2022-01-10 ENCOUNTER — APPOINTMENT (OUTPATIENT)
Dept: OTOLARYNGOLOGY | Facility: CLINIC | Age: 27
End: 2022-01-10

## 2022-01-20 ENCOUNTER — NON-APPOINTMENT (OUTPATIENT)
Age: 27
End: 2022-01-20

## 2022-01-20 DIAGNOSIS — R79.89 OTHER SPECIFIED ABNORMAL FINDINGS OF BLOOD CHEMISTRY: ICD-10-CM

## 2022-01-20 LAB
ALBUMIN SERPL ELPH-MCNC: 4.3 G/DL
ALP BLD-CCNC: 116 U/L
ALT SERPL-CCNC: 25 U/L
ANION GAP SERPL CALC-SCNC: 15 MMOL/L
AST SERPL-CCNC: 18 U/L
BILIRUB SERPL-MCNC: 0.3 MG/DL
BUN SERPL-MCNC: 19 MG/DL
CALCIUM SERPL-MCNC: 9.6 MG/DL
CHLORIDE SERPL-SCNC: 105 MMOL/L
CO2 SERPL-SCNC: 20 MMOL/L
CREAT SERPL-MCNC: 1.04 MG/DL
FSH SERPL-MCNC: 1.4 IU/L
GLUCOSE SERPL-MCNC: 101 MG/DL
LH SERPL-ACNC: 6.6 IU/L
POTASSIUM SERPL-SCNC: 4.3 MMOL/L
PROLACTIN SERPL-MCNC: 33.8 NG/ML
PROT SERPL-MCNC: 6.9 G/DL
SODIUM SERPL-SCNC: 140 MMOL/L
TESTOST FREE SERPL-MCNC: 25.3 PG/ML
TESTOST SERPL-MCNC: 624 NG/DL
TSH SERPL-ACNC: 2.84 UIU/ML

## 2022-01-21 ENCOUNTER — APPOINTMENT (OUTPATIENT)
Dept: ELECTROPHYSIOLOGY | Facility: CLINIC | Age: 27
End: 2022-01-21
Payer: COMMERCIAL

## 2022-01-21 ENCOUNTER — NON-APPOINTMENT (OUTPATIENT)
Age: 27
End: 2022-01-21

## 2022-01-21 ENCOUNTER — APPOINTMENT (OUTPATIENT)
Dept: CARDIOLOGY | Facility: CLINIC | Age: 27
End: 2022-01-21
Payer: COMMERCIAL

## 2022-01-21 VITALS
SYSTOLIC BLOOD PRESSURE: 131 MMHG | BODY MASS INDEX: 30.66 KG/M2 | HEART RATE: 71 BPM | DIASTOLIC BLOOD PRESSURE: 87 MMHG | WEIGHT: 265 LBS | HEIGHT: 78 IN | OXYGEN SATURATION: 97 %

## 2022-01-21 DIAGNOSIS — R00.0 TACHYCARDIA, UNSPECIFIED: ICD-10-CM

## 2022-01-21 PROCEDURE — 99214 OFFICE O/P EST MOD 30 MIN: CPT

## 2022-01-21 PROCEDURE — 93000 ELECTROCARDIOGRAM COMPLETE: CPT

## 2022-01-21 RX ORDER — LORATADINE 10 MG
5-120 TABLET ORAL
Qty: 20 | Refills: 0 | Status: DISCONTINUED | COMMUNITY
Start: 2021-12-27 | End: 2022-01-21

## 2022-01-21 RX ORDER — FLUTICASONE PROPIONATE 50 UG/1
50 SPRAY, METERED NASAL DAILY
Qty: 1 | Refills: 10 | Status: DISCONTINUED | COMMUNITY
Start: 2021-05-13 | End: 2022-01-21

## 2022-01-29 PROBLEM — R00.0 TACHYCARDIA: Status: ACTIVE | Noted: 2022-01-29

## 2022-01-29 NOTE — HISTORY OF PRESENT ILLNESS
[FreeTextEntry1] : 27 year old male without any past medical or cardiac history who presented to ER several weeks ago with covid symtptoms.   Pt was discharged from the ED and told to followup with a cardiologist.  Pt shortness of breath sx have slowly improved since ER visit.   He currently denies CP, SOB or H/A.  Once again got covid with tachycardia but no extra or irregular beats

## 2022-01-29 NOTE — DISCUSSION/SUMMARY
[FreeTextEntry1] : Syncope-Assoc with SOB in setting of covid.  No further sx\par \par Tachycardia- covid related without syncope\par \par Followup in 6 mths\par \par Time spent reviewing history, other MD notes, exam, pt discussion and note writing

## 2022-02-14 PROCEDURE — 93248 EXT ECG>7D<15D REV&INTERPJ: CPT

## 2022-03-01 ENCOUNTER — NON-APPOINTMENT (OUTPATIENT)
Age: 27
End: 2022-03-01

## 2022-03-01 LAB
PROLACTIN SERPL-MCNC: 21.4 NG/ML
T3FREE SERPL-MCNC: 3.75 PG/ML
T4 FREE SERPL-MCNC: 1.4 NG/DL
TSH SERPL-ACNC: 1.5 UIU/ML

## 2022-03-04 ENCOUNTER — NON-APPOINTMENT (OUTPATIENT)
Age: 27
End: 2022-03-04

## 2022-03-04 ENCOUNTER — APPOINTMENT (OUTPATIENT)
Dept: CARDIOLOGY | Facility: CLINIC | Age: 27
End: 2022-03-04
Payer: COMMERCIAL

## 2022-03-04 VITALS
SYSTOLIC BLOOD PRESSURE: 126 MMHG | HEART RATE: 79 BPM | WEIGHT: 265 LBS | BODY MASS INDEX: 30.66 KG/M2 | HEIGHT: 78 IN | OXYGEN SATURATION: 97 % | DIASTOLIC BLOOD PRESSURE: 84 MMHG

## 2022-03-04 DIAGNOSIS — R03.0 ELEVATED BLOOD-PRESSURE READING, W/OUT DIAGNOSIS OF HYPERTENSION: ICD-10-CM

## 2022-03-04 DIAGNOSIS — R55 SYNCOPE AND COLLAPSE: ICD-10-CM

## 2022-03-04 PROCEDURE — 93000 ELECTROCARDIOGRAM COMPLETE: CPT

## 2022-03-04 PROCEDURE — 99214 OFFICE O/P EST MOD 30 MIN: CPT

## 2022-03-18 ENCOUNTER — NON-APPOINTMENT (OUTPATIENT)
Age: 27
End: 2022-03-18

## 2022-03-18 ENCOUNTER — APPOINTMENT (OUTPATIENT)
Dept: OPHTHALMOLOGY | Facility: CLINIC | Age: 27
End: 2022-03-18
Payer: COMMERCIAL

## 2022-03-18 PROCEDURE — 99214 OFFICE O/P EST MOD 30 MIN: CPT

## 2022-03-18 PROCEDURE — V2718: CPT

## 2022-03-18 PROCEDURE — 92083 EXTENDED VISUAL FIELD XM: CPT

## 2022-03-27 PROBLEM — R03.0 BORDERLINE HYPERTENSION: Status: ACTIVE | Noted: 2021-05-12

## 2022-03-27 PROBLEM — R55 NEAR SYNCOPE: Status: ACTIVE | Noted: 2021-02-21

## 2022-03-27 NOTE — PHYSICAL EXAM
[General Appearance - Well Developed] : well developed [Normal Appearance] : normal appearance [Well Groomed] : well groomed [General Appearance - Well Nourished] : well nourished [No Deformities] : no deformities [General Appearance - In No Acute Distress] : no acute distress [Normal Conjunctiva] : the conjunctiva exhibited no abnormalities [Eyelids - No Xanthelasma] : the eyelids demonstrated no xanthelasmas [Normal Oral Mucosa] : normal oral mucosa [No Oral Pallor] : no oral pallor [No Oral Cyanosis] : no oral cyanosis [Normal Jugular Venous A Waves Present] : normal jugular venous A waves present [Normal Jugular Venous V Waves Present] : normal jugular venous V waves present [No Jugular Venous Brown A Waves] : no jugular venous brown A waves [Respiration, Rhythm And Depth] : normal respiratory rhythm and effort [Exaggerated Use Of Accessory Muscles For Inspiration] : no accessory muscle use [Auscultation Breath Sounds / Voice Sounds] : lungs were clear to auscultation bilaterally [Heart Sounds] : normal S1 and S2 [Heart Rate And Rhythm] : heart rate and rhythm were normal [Murmurs] : no murmurs present [Abdomen Soft] : soft [Abdomen Tenderness] : non-tender [Abdomen Mass (___ Cm)] : no abdominal mass palpated [Gait - Sufficient For Exercise Testing] : the gait was sufficient for exercise testing [Abnormal Walk] : normal gait [Nail Clubbing] : no clubbing of the fingernails [Cyanosis, Localized] : no localized cyanosis [Petechial Hemorrhages (___cm)] : no petechial hemorrhages [Skin Color & Pigmentation] : normal skin color and pigmentation [] : no rash [No Venous Stasis] : no venous stasis [Skin Lesions] : no skin lesions [No Skin Ulcers] : no skin ulcer [No Xanthoma] : no  xanthoma was observed [Affect] : the affect was normal [Oriented To Time, Place, And Person] : oriented to person, place, and time [Mood] : the mood was normal [No Anxiety] : not feeling anxious

## 2022-04-04 ENCOUNTER — APPOINTMENT (OUTPATIENT)
Dept: INTERNAL MEDICINE | Facility: CLINIC | Age: 27
End: 2022-04-04

## 2022-04-05 ENCOUNTER — APPOINTMENT (OUTPATIENT)
Dept: ULTRASOUND IMAGING | Facility: CLINIC | Age: 27
End: 2022-04-05
Payer: COMMERCIAL

## 2022-04-05 ENCOUNTER — APPOINTMENT (OUTPATIENT)
Dept: INTERNAL MEDICINE | Facility: CLINIC | Age: 27
End: 2022-04-05
Payer: COMMERCIAL

## 2022-04-05 VITALS
OXYGEN SATURATION: 98 % | HEART RATE: 76 BPM | DIASTOLIC BLOOD PRESSURE: 88 MMHG | TEMPERATURE: 98.4 F | BODY MASS INDEX: 31.01 KG/M2 | HEIGHT: 78 IN | WEIGHT: 268 LBS | SYSTOLIC BLOOD PRESSURE: 135 MMHG

## 2022-04-05 DIAGNOSIS — N50.819 TESTICULAR PAIN, UNSPECIFIED: ICD-10-CM

## 2022-04-05 LAB
BILIRUB UR QL STRIP: NEGATIVE
CLARITY UR: CLEAR
COLLECTION METHOD: NORMAL
GLUCOSE UR-MCNC: NEGATIVE
HCG UR QL: 0.2 EU/DL
HGB UR QL STRIP.AUTO: NEGATIVE
KETONES UR-MCNC: NEGATIVE
LEUKOCYTE ESTERASE UR QL STRIP: NEGATIVE
NITRITE UR QL STRIP: NEGATIVE
PH UR STRIP: 5.5
PROT UR STRIP-MCNC: NEGATIVE
SP GR UR STRIP: 1.01

## 2022-04-05 PROCEDURE — 99202 OFFICE O/P NEW SF 15 MIN: CPT

## 2022-04-05 PROCEDURE — 76870 US EXAM SCROTUM: CPT

## 2022-04-05 PROCEDURE — 99212 OFFICE O/P EST SF 10 MIN: CPT

## 2022-04-05 NOTE — HISTORY OF PRESENT ILLNESS
[FreeTextEntry8] : CC right testicular pain for the past 3 to 4 days\par \par HPI the patient is a healthy 27-year-old male with history of COVID infection complicated by long COVID cardiac nasal and neurological involvement, and overweight who presents with 3 to 4 day history of right testicular pain denies fever, chills, swelling, redness, discharge. Patient is sexually active with several partners uses condoms well.

## 2022-04-05 NOTE — ASSESSMENT
[FreeTextEntry1] : Patient is a 27-year-old male with history of long COVID brain fog, sinus issues, neurological issues who presents with 3 to 4 day history of right testicular pain\par \par Right testicular pain\par most likely musculoskeletal versus rule out varicocele\par urine dipstick negative WBCs await GC chlamydia\par await testicular sonogram tablet torsion\par \par Long COVID\par follow-up with neurology cardiology ENT\par

## 2022-04-05 NOTE — PHYSICAL EXAM
[Penis Abnormality] : normal circumcised penis [Scrotum] : the scrotum was normal [Rectal Exam - Seminal Vesicles] : the seminal vesicles were normal [Epididymis] : the epididymides were normal [Normal] : affect was normal and insight and judgment were intact [FreeTextEntry1] : No swelling minimal tenderness of the right testicle

## 2022-04-06 ENCOUNTER — APPOINTMENT (OUTPATIENT)
Dept: ULTRASOUND IMAGING | Facility: CLINIC | Age: 27
End: 2022-04-06

## 2022-04-06 LAB
C TRACH RRNA SPEC QL NAA+PROBE: NOT DETECTED
N GONORRHOEA RRNA SPEC QL NAA+PROBE: NOT DETECTED
SOURCE AMPLIFICATION: NORMAL

## 2022-04-15 ENCOUNTER — APPOINTMENT (OUTPATIENT)
Dept: NEUROLOGY | Facility: CLINIC | Age: 27
End: 2022-04-15
Payer: COMMERCIAL

## 2022-04-15 VITALS
BODY MASS INDEX: 30.08 KG/M2 | HEART RATE: 55 BPM | WEIGHT: 260 LBS | DIASTOLIC BLOOD PRESSURE: 85 MMHG | HEIGHT: 78 IN | SYSTOLIC BLOOD PRESSURE: 122 MMHG

## 2022-04-15 DIAGNOSIS — U09.9 POST COVID-19 CONDITION, UNSPECIFIED: ICD-10-CM

## 2022-04-15 DIAGNOSIS — H53.2 DIPLOPIA: ICD-10-CM

## 2022-04-15 PROCEDURE — 99214 OFFICE O/P EST MOD 30 MIN: CPT

## 2022-04-15 NOTE — ASSESSMENT
[FreeTextEntry1] : Mr. Maguire is a 27-year-old who suffers from long COVID.  He has persistent tinnitus, visual snow and intermittent diplopia, the latter undoubtedly due to a right 4th nerve palsy.  I suggested conservative measures.  I will order updated blood tests including cardiolipin antibodies and myasthenia panel.  I will also check his COVID 19 nucleocapsid and spike antibodies.  I agree that rechallenge with the COVID-19 vaccine could be problematic.  I suggested close telephone and office follow-up.

## 2022-06-17 ENCOUNTER — APPOINTMENT (OUTPATIENT)
Dept: CARDIOLOGY | Facility: CLINIC | Age: 27
End: 2022-06-17

## 2022-08-17 ENCOUNTER — APPOINTMENT (OUTPATIENT)
Dept: OTOLARYNGOLOGY | Facility: CLINIC | Age: 27
End: 2022-08-17

## 2022-08-17 VITALS
HEART RATE: 75 BPM | DIASTOLIC BLOOD PRESSURE: 83 MMHG | SYSTOLIC BLOOD PRESSURE: 125 MMHG | HEIGHT: 78 IN | WEIGHT: 260 LBS | BODY MASS INDEX: 30.08 KG/M2 | OXYGEN SATURATION: 96 % | TEMPERATURE: 97.8 F

## 2022-08-17 DIAGNOSIS — H92.01 OTALGIA, RIGHT EAR: ICD-10-CM

## 2022-08-17 DIAGNOSIS — H93.8X1 OTHER SPECIFIED DISORDERS OF RIGHT EAR: ICD-10-CM

## 2022-08-17 DIAGNOSIS — J32.9 CHRONIC SINUSITIS, UNSPECIFIED: ICD-10-CM

## 2022-08-17 DIAGNOSIS — H66.90 OTITIS MEDIA, UNSPECIFIED, UNSPECIFIED EAR: ICD-10-CM

## 2022-08-17 PROCEDURE — 31231 NASAL ENDOSCOPY DX: CPT

## 2022-08-17 PROCEDURE — 99213 OFFICE O/P EST LOW 20 MIN: CPT | Mod: 25

## 2022-08-17 RX ORDER — METHYLPREDNISOLONE 4 MG/1
4 TABLET ORAL
Qty: 1 | Refills: 0 | Status: ACTIVE | COMMUNITY
Start: 2022-08-17 | End: 1900-01-01

## 2022-08-17 RX ORDER — AZITHROMYCIN 500 MG/1
500 TABLET, FILM COATED ORAL DAILY
Qty: 1 | Refills: 0 | Status: ACTIVE | COMMUNITY
Start: 2022-08-17 | End: 1900-01-01

## 2022-08-17 NOTE — REASON FOR VISIT
[Subsequent Evaluation] : a subsequent evaluation for [FreeTextEntry2] : Persistent right ear fullness and nasal congestion for the past couple of weeks

## 2022-08-17 NOTE — REVIEW OF SYSTEMS
[Patient Intake Form Reviewed] : Patient intake form was reviewed [Ear Pain] : ear pain [Ear Drainage] : ear drainage [As Noted in HPI] : as noted in HPI [Nasal Congestion] : nasal congestion [Negative] : Heme/Lymph

## 2022-08-17 NOTE — HISTORY OF PRESENT ILLNESS
[de-identified] : 27 years old male patient with history of Persistent right ear fullness and nasal congestion for the past couple of weeks.  Patient is present today in  the office with Sinusitis and Right side Otitis media.  Turbinate Hypertrophy

## 2022-09-20 ENCOUNTER — NON-APPOINTMENT (OUTPATIENT)
Age: 27
End: 2022-09-20

## 2022-09-20 ENCOUNTER — APPOINTMENT (OUTPATIENT)
Dept: OPHTHALMOLOGY | Facility: CLINIC | Age: 27
End: 2022-09-20

## 2022-09-20 PROCEDURE — 92014 COMPRE OPH EXAM EST PT 1/>: CPT

## 2022-12-16 ENCOUNTER — APPOINTMENT (OUTPATIENT)
Dept: OPHTHALMOLOGY | Facility: CLINIC | Age: 27
End: 2022-12-16

## 2022-12-27 ENCOUNTER — APPOINTMENT (OUTPATIENT)
Dept: OTOLARYNGOLOGY | Facility: CLINIC | Age: 27
End: 2022-12-27

## 2023-03-24 ENCOUNTER — APPOINTMENT (OUTPATIENT)
Dept: NEUROLOGY | Facility: CLINIC | Age: 28
End: 2023-03-24

## 2023-12-12 NOTE — ED ADULT NURSE NOTE - OBJECTIVE STATEMENT
26 YOM A&OX3 with no pertinent pmh presents to ED for facial discomfort. pt states was recently treated for sinus infection (on Augmentin) that started 5 days ago. Pt states has been having facial discomfort and "popping" sound in right ear that started today. pt states right facial discomfort and headache is rated 4/10. pt denies chest pain, sob, fevers/chills, dizziness, blurry vision. safety maintained. none

## 2025-08-18 ENCOUNTER — NON-APPOINTMENT (OUTPATIENT)
Age: 30
End: 2025-08-18

## 2025-08-18 ENCOUNTER — APPOINTMENT (OUTPATIENT)
Dept: OPHTHALMOLOGY | Facility: CLINIC | Age: 30
End: 2025-08-18
Payer: COMMERCIAL

## 2025-08-18 PROCEDURE — 92060 SENSORIMOTOR EXAMINATION: CPT

## 2025-08-18 PROCEDURE — 92004 COMPRE OPH EXAM NEW PT 1/>: CPT
